# Patient Record
Sex: FEMALE | Race: WHITE | NOT HISPANIC OR LATINO | Employment: FULL TIME
[De-identification: names, ages, dates, MRNs, and addresses within clinical notes are randomized per-mention and may not be internally consistent; named-entity substitution may affect disease eponyms.]

---

## 2018-03-10 NOTE — H&P
"PATIENT:  Rachel Danielle  YOB: 1995  AGE:  22 years  DATE:   2018 10:10 AM   VISIT TYPE: OB Prenatal - Initial  _________________________________________________________________________________________________________________________      This 22 year old female presents for Pregnancy confirmation visit and Viability ultrasound.    History of Present Illness  1.  Pregnancy confirmation visit     presents for pregancy confirmation/initial OB. She reports she was previously seen at a clinic in [place] where she had an 8 week ultrasound with fetal cardiac activity. No lab work was done there.   She presents without complaints. No VB, cramping, abdominal pain, n/v, HA, f/c, abnormal discharge.   She reports h/o MAB 2017. She did not know she was pregnant but had heavy VB. She was given a medication because \"not everything came out\" and reports this gave her terrible cramping and she ultimately had bleeding for nearly 3 months.   She denies medical problems or medications.  She is taking PNV. She does admit marijuana use in early pregnancy before she knew she was pregnant.   The pregnancy confirmation packet was reviewed. Baseline risks of aneuploidy were reviewed including potential screening options including Innatal, FTS, quad screen. Carrier screening was reviewed. She is interested in Innatal and PreParent. Will obtain PNL as well.  2.  Viability ultrasound    MAB    Obstetric History  : 2  Parity:    Term:0  Pre-Term: 0  : 2  Livin    Adopted:  0      Gestational Age  Gestational Age: 11w2d  JOHANA Confirmation  Initial JOHANA: DATE                         EGA                                     JOHANA   LMP 2017 11w2d     2018   FINAL JOHANA 2018  Multiple Gestation:  [place]        MEDICAL HISTORY      Genetic Screening/Teratology Counseling    Includes patient, baby's father or anyone in either family      Yes         No Mother    Father    " Relative   1.  Patient's age 35 years or older as of estimated date          of delivery.  no       2.  Thalassemia (Namibian, Greek, Mediterranean or           background); MCV less than 80         3.  Neural tube defect (meningomyelocele, spina bifida         or        anencephaly)         4.  Congenital heart defect         5.  Down syndrome         6.  Todd-sachs (Ashkenazi Jew, Cajun, French       [place])         7.  Canavan disease (Ashkenazi Jew)         8.  Familial dysautonomia (Ashkenazi Jew)         9.  Sickle cell disease or trait ()        10.  Hemophilia or other blood disorders        11.  Muscular dystrophy        12.  Cystic fibrosis        13.  Claiborne's chorea        14.  Mental retardation/autism        15.  Other inherited genetic or chromosomal disorder        16.  Maternal metabolic disorder (e.g. Type 1 Diabetes,           PKU)        17.  Patient or baby's father had a child with birth defects not listed above.        18.  Recurrent pregnancy loss or a stillbirth        19.  Medications (including supplements, vitamins, herbs or  OTC drugs/illicit/recreational drugs/alcohol since LMP          20.  Other:         Risk factors positive for:    Social Factors   Cats   GENERAL:     Cats in the home.    Obstetric History  Pregnancy # Date GA Labor(hrs) Weight Sex Procedure   1      Spontaneaous    2  8W0D    Missed                              Medication Reconciliation -   Medications reconciled today.    Allergies  Description Reaction   NO KNOWN ALLERGIES    Reviewed, no changes.  No known allergies.    Allergy Comments:  Tree Nuts    Medications (added, continued, or stopped this visit)  Started Medication Directions Instruction Stopped   2018 Prenatal Vitamin tablet take 1 tablet by oral route  every day     2018 Zofran 4 mg tablet take one tab po daily as needed  03/10/2018     Gynecologic History  Patient is premenopausal. Last menses was  2017.     Obstetric History  Not currently pregnant.   2  Parity: Term:0  Pre-Term: 0  : 2  Livin    Spontaneous abortions: 2.    Pregnancy # Date GA Labor(hrs) Weight Sex Procedure   1      Spontaneaous    2  8W0D    Missed      Medical History (Detailed)  Acne   Migraines   Moodiness   Depression       Surgical History/Management (Detailed)    Diagnostics History  ActStatus Study Ordered Completed Interpretation  Result / Report   completed Transvaginal US, OB 2018   MAB     Family History  (Detailed)  Relationship Family Member Name  Age at Death Condition Onset Age Cause of Death   Father  Y 44 Cardiac Arrest  Y   Mother    Mental illness  N     Social History  (Detailed)    General Information  Employment History Status Retired Restrictions            Tobacco  Smoking status: Former smoker.      Smoking Status  Use Status Type Smoking Status Usage Per Day Years Used Total Pack Years   yes  Former smoker              Alcohol  There is no history of alcohol use.      Caffeine  The patient uses caffeine.  Type: soda     Lifestyle  Vigorous activity level.    Never exercises.    Pets include dogs, cats.    Home Environment/Safety  Smoke detector(s) at home.   Additional Social History  The patient does not need an .      Marital Status/Family/Social Support  Currently single.      Prenatal Flowsheet  General  Height(in.):64.5 inches  Pre-pregnancy weight:   150.00.   Last weight: 152.20. Date: 2018.   Total weight gain:  2.20.   Cats in home    Problem Detail  Priority Problem Detail    Primary Dr. Kraus     Initial Physical Exam   Pre-pregnancy weight: 150.00 lbs. Today's weight:    Height: 64.5 inches           Review of Systems  System Neg/Pos Details   Reproductive Positive The patient is pre-menopausal.   Respiratory Negative Cough and dyspnea.   MS Negative Back pain.   Reproductive Negative Vaginal discharge.    Constitutional Negative Change in appetite, chills, chills/rigors, decreased activity, fatigue, fever, fussiness, generalized weakness, increased appetite, irritability, lethargy, malaise, night sweats, pallor, weight gain and weight loss.    Negative Dysuria, hematuria, irregular menses and urinary frequency.   GI Negative Abdominal pain, constipation, decreased appetite, diarrhea, nausea and vomiting.   Psych Negative Anxiety and depression.   Hema/Lymph Negative Easy bleeding and easy bruising.       Vital Signs   Last menses was 2017.   Time BP mm/Hg Pulse/min Resp/min Temp F Ht ft Ht in Wt lb BMI kg/m2 BSA m2   10:01 /70    5.0 4.50 152.20 25.72      MEASURED BY  Time Measured by   10:01 AM Eliza Novoa     Orders/Procedures/Instructions/Education  LABS:  ABO and Rh Type and Antibody Screen       Physical Exam  Exam Findings Details   Constitutional Normal Well developed.   Neck Exam Normal Palpation - Normal. Thyroid gland - Normal.   Respiratory Normal Inspection - Normal. Auscultation - Normal. Effort - Normal.   Cardiovascular Normal Regular rhythm.  No murmurs, gallops, or rubs.   Abdomen Normal Anterior palpation -  No rebound. No abdominal tenderness.   Genitourinary * Cervix - cervix closed, no bleeding. Uterus - 8-10 wk size.   Genitourinary Normal Urethral meatus - Normal. External genitalia - Normal. Glands - Normal. Perineum - Normal. Anus - Normal. Vagina - Normal. Adnexa - Normal. No suprapubic tenderness. No vaginal discharge.   Extremity Normal No edema.   Psychiatric Normal Oriented to time, place, person and situation. Appropriate mood and affect.     Assessment/Plan  # Detail Type Description    1. Assessment Missed  (O02.1).    Provider Plan  with LMP 17 presented as ESTEFANY for pregnancy confirmation and initial OB. On exam, FHT not able to be identified with doptone. She was brought to u/s - fetus measures 8+1 and no FCA is identified. She is sure she was  8 wks along with FCA on previous u/s (we do not have this report). Discussed regardless u/s findings today c/w missed . She has had no pain or bleeding. Given ~3 weeks since this occurred and no VB, pain, cervical dilation, would recommend against expectant management at this point. Discussed medical management with cytotec vs surgical management with D&C. She reports bad experience with cytotec previously which sounds like it was given in setting of retained POC after SAB. She would prefer surgical management. Pt clinically stable at this point and no indication for emergent intervention. The procedure will be scheduled for 11:30 3/12/18 with Dr. Tsai (I will be out of town, he is aware). Blood type obtained, other PNL canceled.    Plan Orders ABO and Rh Type and Antibody Screen to be performed.         2. Assessment Encounter for pregnancy test, result positive (Z32.01).    Plan Orders The patient had the following test(s) completed today Urine pregnancy test.

## 2018-03-12 ENCOUNTER — SURGERY (OUTPATIENT)
Age: 23
End: 2018-03-12

## 2018-03-12 ENCOUNTER — ANESTHESIA (OUTPATIENT)
Dept: SURGERY | Facility: CLINIC | Age: 23
End: 2018-03-12
Payer: COMMERCIAL

## 2018-03-12 ENCOUNTER — ANESTHESIA EVENT (OUTPATIENT)
Dept: SURGERY | Facility: CLINIC | Age: 23
End: 2018-03-12
Payer: COMMERCIAL

## 2018-03-12 ENCOUNTER — HOSPITAL ENCOUNTER (OUTPATIENT)
Facility: CLINIC | Age: 23
Discharge: HOME | End: 2018-03-12
Attending: OBSTETRICS & GYNECOLOGY | Admitting: OBSTETRICS & GYNECOLOGY
Payer: COMMERCIAL

## 2018-03-12 ENCOUNTER — APPOINTMENT (OUTPATIENT)
Dept: ULTRASOUND IMAGING | Facility: CLINIC | Age: 23
End: 2018-03-12
Attending: OBSTETRICS & GYNECOLOGY
Payer: COMMERCIAL

## 2018-03-12 VITALS
BODY MASS INDEX: 25.95 KG/M2 | WEIGHT: 152 LBS | RESPIRATION RATE: 18 BRPM | HEIGHT: 64 IN | DIASTOLIC BLOOD PRESSURE: 74 MMHG | TEMPERATURE: 97.7 F | OXYGEN SATURATION: 97 % | SYSTOLIC BLOOD PRESSURE: 105 MMHG

## 2018-03-12 DIAGNOSIS — O02.1 MISSED ABORTION: Primary | ICD-10-CM

## 2018-03-12 LAB
B-HCG SERPL-ACNC: 5698 IU/L (ref 0–5)
HGB BLD-MCNC: 12.4 G/DL (ref 11.7–15.7)

## 2018-03-12 PROCEDURE — 25000128 H RX IP 250 OP 636: Performed by: NURSE ANESTHETIST, CERTIFIED REGISTERED

## 2018-03-12 PROCEDURE — 25000128 H RX IP 250 OP 636: Performed by: ANESTHESIOLOGY

## 2018-03-12 PROCEDURE — 36000058 ZZH SURGERY LEVEL 3 EA 15 ADDTL MIN: Performed by: OBSTETRICS & GYNECOLOGY

## 2018-03-12 PROCEDURE — 85018 HEMOGLOBIN: CPT | Performed by: OBSTETRICS & GYNECOLOGY

## 2018-03-12 PROCEDURE — 88305 TISSUE EXAM BY PATHOLOGIST: CPT | Mod: 26 | Performed by: OBSTETRICS & GYNECOLOGY

## 2018-03-12 PROCEDURE — 71000027 ZZH RECOVERY PHASE 2 EACH 15 MINS: Performed by: OBSTETRICS & GYNECOLOGY

## 2018-03-12 PROCEDURE — 88305 TISSUE EXAM BY PATHOLOGIST: CPT | Performed by: OBSTETRICS & GYNECOLOGY

## 2018-03-12 PROCEDURE — 37000008 ZZH ANESTHESIA TECHNICAL FEE, 1ST 30 MIN: Performed by: OBSTETRICS & GYNECOLOGY

## 2018-03-12 PROCEDURE — 36415 COLL VENOUS BLD VENIPUNCTURE: CPT | Performed by: OBSTETRICS & GYNECOLOGY

## 2018-03-12 PROCEDURE — 25000566 ZZH SEVOFLURANE, EA 15 MIN: Performed by: OBSTETRICS & GYNECOLOGY

## 2018-03-12 PROCEDURE — 84702 CHORIONIC GONADOTROPIN TEST: CPT | Performed by: OBSTETRICS & GYNECOLOGY

## 2018-03-12 PROCEDURE — 25000125 ZZHC RX 250: Performed by: OBSTETRICS & GYNECOLOGY

## 2018-03-12 PROCEDURE — 37000009 ZZH ANESTHESIA TECHNICAL FEE, EACH ADDTL 15 MIN: Performed by: OBSTETRICS & GYNECOLOGY

## 2018-03-12 PROCEDURE — 40000170 ZZH STATISTIC PRE-PROCEDURE ASSESSMENT II: Performed by: OBSTETRICS & GYNECOLOGY

## 2018-03-12 PROCEDURE — 36000056 ZZH SURGERY LEVEL 3 1ST 30 MIN: Performed by: OBSTETRICS & GYNECOLOGY

## 2018-03-12 PROCEDURE — 25000128 H RX IP 250 OP 636: Performed by: OBSTETRICS & GYNECOLOGY

## 2018-03-12 PROCEDURE — 88233 TISSUE CULTURE SKIN/BIOPSY: CPT | Performed by: OBSTETRICS & GYNECOLOGY

## 2018-03-12 PROCEDURE — 71000012 ZZH RECOVERY PHASE 1 LEVEL 1 FIRST HR: Performed by: OBSTETRICS & GYNECOLOGY

## 2018-03-12 PROCEDURE — 00000159 ZZHCL STATISTIC H-SEND OUTS PREP: Performed by: OBSTETRICS & GYNECOLOGY

## 2018-03-12 PROCEDURE — 40000985 US INTRAOPERATIVE: Mod: TC

## 2018-03-12 PROCEDURE — 25000132 ZZH RX MED GY IP 250 OP 250 PS 637: Performed by: OBSTETRICS & GYNECOLOGY

## 2018-03-12 PROCEDURE — 88262 CHROMOSOME ANALYSIS 15-20: CPT | Performed by: OBSTETRICS & GYNECOLOGY

## 2018-03-12 PROCEDURE — 25000125 ZZHC RX 250: Performed by: NURSE ANESTHETIST, CERTIFIED REGISTERED

## 2018-03-12 PROCEDURE — 71000013 ZZH RECOVERY PHASE 1 LEVEL 1 EA ADDTL HR: Performed by: OBSTETRICS & GYNECOLOGY

## 2018-03-12 RX ORDER — DEXAMETHASONE SODIUM PHOSPHATE 4 MG/ML
INJECTION, SOLUTION INTRA-ARTICULAR; INTRALESIONAL; INTRAMUSCULAR; INTRAVENOUS; SOFT TISSUE PRN
Status: DISCONTINUED | OUTPATIENT
Start: 2018-03-12 | End: 2018-03-12

## 2018-03-12 RX ORDER — OXYCODONE HYDROCHLORIDE 5 MG/1
5 TABLET ORAL
Status: COMPLETED | OUTPATIENT
Start: 2018-03-12 | End: 2018-03-12

## 2018-03-12 RX ORDER — DOXYCYCLINE 100 MG/10ML
100 INJECTION, POWDER, LYOPHILIZED, FOR SOLUTION INTRAVENOUS
Status: COMPLETED | OUTPATIENT
Start: 2018-03-12 | End: 2018-03-12

## 2018-03-12 RX ORDER — DOXYCYCLINE 100 MG/1
200 CAPSULE ORAL ONCE
Status: COMPLETED | OUTPATIENT
Start: 2018-03-12 | End: 2018-03-12

## 2018-03-12 RX ORDER — HYDROMORPHONE HYDROCHLORIDE 1 MG/ML
.3-.5 INJECTION, SOLUTION INTRAMUSCULAR; INTRAVENOUS; SUBCUTANEOUS EVERY 10 MIN PRN
Status: DISCONTINUED | OUTPATIENT
Start: 2018-03-12 | End: 2018-03-12 | Stop reason: HOSPADM

## 2018-03-12 RX ORDER — SODIUM CHLORIDE, SODIUM LACTATE, POTASSIUM CHLORIDE, CALCIUM CHLORIDE 600; 310; 30; 20 MG/100ML; MG/100ML; MG/100ML; MG/100ML
INJECTION, SOLUTION INTRAVENOUS CONTINUOUS PRN
Status: DISCONTINUED | OUTPATIENT
Start: 2018-03-12 | End: 2018-03-12

## 2018-03-12 RX ORDER — OXYCODONE HYDROCHLORIDE 5 MG/1
5-10 TABLET ORAL
Qty: 12 TABLET | Refills: 0 | Status: SHIPPED | OUTPATIENT
Start: 2018-03-12 | End: 2018-06-12

## 2018-03-12 RX ORDER — ONDANSETRON 2 MG/ML
4 INJECTION INTRAMUSCULAR; INTRAVENOUS EVERY 30 MIN PRN
Status: DISCONTINUED | OUTPATIENT
Start: 2018-03-12 | End: 2018-03-12 | Stop reason: HOSPADM

## 2018-03-12 RX ORDER — FENTANYL CITRATE 50 UG/ML
25-50 INJECTION, SOLUTION INTRAMUSCULAR; INTRAVENOUS EVERY 5 MIN PRN
Status: DISCONTINUED | OUTPATIENT
Start: 2018-03-12 | End: 2018-03-12 | Stop reason: HOSPADM

## 2018-03-12 RX ORDER — PROPOFOL 10 MG/ML
INJECTION, EMULSION INTRAVENOUS PRN
Status: DISCONTINUED | OUTPATIENT
Start: 2018-03-12 | End: 2018-03-12

## 2018-03-12 RX ORDER — KETOROLAC TROMETHAMINE 30 MG/ML
30 INJECTION, SOLUTION INTRAMUSCULAR; INTRAVENOUS EVERY 6 HOURS PRN
Status: DISCONTINUED | OUTPATIENT
Start: 2018-03-12 | End: 2018-03-12 | Stop reason: HOSPADM

## 2018-03-12 RX ORDER — SODIUM CHLORIDE, SODIUM LACTATE, POTASSIUM CHLORIDE, CALCIUM CHLORIDE 600; 310; 30; 20 MG/100ML; MG/100ML; MG/100ML; MG/100ML
INJECTION, SOLUTION INTRAVENOUS CONTINUOUS
Status: DISCONTINUED | OUTPATIENT
Start: 2018-03-12 | End: 2018-03-12 | Stop reason: HOSPADM

## 2018-03-12 RX ORDER — FENTANYL CITRATE 50 UG/ML
50-100 INJECTION, SOLUTION INTRAMUSCULAR; INTRAVENOUS
Status: DISCONTINUED | OUTPATIENT
Start: 2018-03-12 | End: 2018-03-12 | Stop reason: HOSPADM

## 2018-03-12 RX ORDER — ONDANSETRON 4 MG/1
4 TABLET, ORALLY DISINTEGRATING ORAL EVERY 30 MIN PRN
Status: DISCONTINUED | OUTPATIENT
Start: 2018-03-12 | End: 2018-03-12 | Stop reason: HOSPADM

## 2018-03-12 RX ORDER — IBUPROFEN 600 MG/1
600 TABLET, FILM COATED ORAL
Status: DISCONTINUED | OUTPATIENT
Start: 2018-03-12 | End: 2018-03-12 | Stop reason: HOSPADM

## 2018-03-12 RX ORDER — ONDANSETRON 2 MG/ML
INJECTION INTRAMUSCULAR; INTRAVENOUS PRN
Status: DISCONTINUED | OUTPATIENT
Start: 2018-03-12 | End: 2018-03-12

## 2018-03-12 RX ORDER — IBUPROFEN 600 MG/1
600 TABLET, FILM COATED ORAL EVERY 6 HOURS PRN
Qty: 30 TABLET | Refills: 0 | Status: SHIPPED | OUTPATIENT
Start: 2018-03-12 | End: 2018-06-12

## 2018-03-12 RX ORDER — NALOXONE HYDROCHLORIDE 0.4 MG/ML
.1-.4 INJECTION, SOLUTION INTRAMUSCULAR; INTRAVENOUS; SUBCUTANEOUS
Status: DISCONTINUED | OUTPATIENT
Start: 2018-03-12 | End: 2018-03-12 | Stop reason: HOSPADM

## 2018-03-12 RX ORDER — LIDOCAINE HYDROCHLORIDE 20 MG/ML
INJECTION, SOLUTION INFILTRATION; PERINEURAL PRN
Status: DISCONTINUED | OUTPATIENT
Start: 2018-03-12 | End: 2018-03-12

## 2018-03-12 RX ORDER — PROPOFOL 10 MG/ML
INJECTION, EMULSION INTRAVENOUS CONTINUOUS PRN
Status: DISCONTINUED | OUTPATIENT
Start: 2018-03-12 | End: 2018-03-12

## 2018-03-12 RX ORDER — DIPHENHYDRAMINE HYDROCHLORIDE 50 MG/ML
INJECTION INTRAMUSCULAR; INTRAVENOUS PRN
Status: DISCONTINUED | OUTPATIENT
Start: 2018-03-12 | End: 2018-03-12

## 2018-03-12 RX ORDER — FENTANYL CITRATE 50 UG/ML
INJECTION, SOLUTION INTRAMUSCULAR; INTRAVENOUS PRN
Status: DISCONTINUED | OUTPATIENT
Start: 2018-03-12 | End: 2018-03-12

## 2018-03-12 RX ORDER — MEPERIDINE HYDROCHLORIDE 25 MG/ML
12.5 INJECTION INTRAMUSCULAR; INTRAVENOUS; SUBCUTANEOUS
Status: DISCONTINUED | OUTPATIENT
Start: 2018-03-12 | End: 2018-03-12 | Stop reason: HOSPADM

## 2018-03-12 RX ORDER — EPHEDRINE SULFATE 50 MG/ML
INJECTION, SOLUTION INTRAMUSCULAR; INTRAVENOUS; SUBCUTANEOUS PRN
Status: DISCONTINUED | OUTPATIENT
Start: 2018-03-12 | End: 2018-03-12

## 2018-03-12 RX ORDER — FENTANYL CITRATE 50 UG/ML
25-50 INJECTION, SOLUTION INTRAMUSCULAR; INTRAVENOUS
Status: DISCONTINUED | OUTPATIENT
Start: 2018-03-12 | End: 2018-03-12 | Stop reason: HOSPADM

## 2018-03-12 RX ADMIN — SUCCINYLCHOLINE CHLORIDE 100 MG: 20 INJECTION, SOLUTION INTRAMUSCULAR; INTRAVENOUS; PARENTERAL at 11:53

## 2018-03-12 RX ADMIN — ONDANSETRON 4 MG: 2 INJECTION INTRAMUSCULAR; INTRAVENOUS at 12:43

## 2018-03-12 RX ADMIN — SODIUM CHLORIDE, POTASSIUM CHLORIDE, SODIUM LACTATE AND CALCIUM CHLORIDE: 600; 310; 30; 20 INJECTION, SOLUTION INTRAVENOUS at 11:49

## 2018-03-12 RX ADMIN — PROPOFOL 150 MCG/KG/MIN: 10 INJECTION, EMULSION INTRAVENOUS at 11:52

## 2018-03-12 RX ADMIN — PHENYLEPHRINE HYDROCHLORIDE 100 MCG: 10 INJECTION INTRAVENOUS at 12:05

## 2018-03-12 RX ADMIN — DEXAMETHASONE SODIUM PHOSPHATE 4 MG: 4 INJECTION, SOLUTION INTRA-ARTICULAR; INTRALESIONAL; INTRAMUSCULAR; INTRAVENOUS; SOFT TISSUE at 12:12

## 2018-03-12 RX ADMIN — SCOPOLAMINE HYDROBROMIDE 0.4 MG: 0.4 INJECTION, SOLUTION INTRAMUSCULAR; INTRAVENOUS; SUBCUTANEOUS at 12:00

## 2018-03-12 RX ADMIN — OXYCODONE HYDROCHLORIDE 5 MG: 5 TABLET ORAL at 13:49

## 2018-03-12 RX ADMIN — ONDANSETRON 4 MG: 2 INJECTION INTRAMUSCULAR; INTRAVENOUS at 12:12

## 2018-03-12 RX ADMIN — DOXYCYCLINE 100 MG: 100 INJECTION, POWDER, LYOPHILIZED, FOR SOLUTION INTRAVENOUS at 12:00

## 2018-03-12 RX ADMIN — FENTANYL CITRATE 50 MCG: 50 INJECTION, SOLUTION INTRAMUSCULAR; INTRAVENOUS at 13:36

## 2018-03-12 RX ADMIN — PHENYLEPHRINE HYDROCHLORIDE 100 MCG: 10 INJECTION INTRAVENOUS at 12:34

## 2018-03-12 RX ADMIN — KETOROLAC TROMETHAMINE 30 MG: 30 INJECTION, SOLUTION INTRAMUSCULAR at 13:40

## 2018-03-12 RX ADMIN — MIDAZOLAM 2 MG: 1 INJECTION INTRAMUSCULAR; INTRAVENOUS at 11:53

## 2018-03-12 RX ADMIN — PROPOFOL 200 MG: 10 INJECTION, EMULSION INTRAVENOUS at 11:53

## 2018-03-12 RX ADMIN — PHENYLEPHRINE HYDROCHLORIDE 100 MCG: 10 INJECTION INTRAVENOUS at 12:07

## 2018-03-12 RX ADMIN — FENTANYL CITRATE 100 MCG: 50 INJECTION, SOLUTION INTRAMUSCULAR; INTRAVENOUS at 11:53

## 2018-03-12 RX ADMIN — DIPHENHYDRAMINE HYDROCHLORIDE 12.5 MG: 50 INJECTION, SOLUTION INTRAMUSCULAR; INTRAVENOUS at 12:09

## 2018-03-12 RX ADMIN — PHENYLEPHRINE HYDROCHLORIDE 100 MCG: 10 INJECTION INTRAVENOUS at 11:58

## 2018-03-12 RX ADMIN — LIDOCAINE HYDROCHLORIDE 100 MG: 20 INJECTION, SOLUTION INFILTRATION; PERINEURAL at 11:53

## 2018-03-12 RX ADMIN — DOXYCYCLINE HYCLATE 200 MG: 100 CAPSULE, GELATIN COATED ORAL at 13:59

## 2018-03-12 RX ADMIN — FENTANYL CITRATE 50 MCG: 50 INJECTION, SOLUTION INTRAMUSCULAR; INTRAVENOUS at 13:28

## 2018-03-12 RX ADMIN — Medication 5 MG: at 12:10

## 2018-03-12 ASSESSMENT — ENCOUNTER SYMPTOMS
ORTHOPNEA: 0
SEIZURES: 0
DYSRHYTHMIAS: 0

## 2018-03-12 ASSESSMENT — LIFESTYLE VARIABLES: TOBACCO_USE: 0

## 2018-03-12 NOTE — ANESTHESIA CARE TRANSFER NOTE
Patient: Rachel Danielle    Procedure(s):   - Wound Class: II-Clean Contaminated    Diagnosis: MISSED AB  Diagnosis Additional Information: No value filed.    Anesthesia Type:   General, RSI, ETT     Note:  Airway :Face Mask  Patient transferred to:PACU  Comments: Pt to PACU. VSS. Report complete to RNHandoff Report: Identifed the Patient, Identified the Reponsible Provider, Reviewed the pertinent medical history, Discussed the surgical course, Reviewed Intra-OP anesthesia mangement and issues during anesthesia, Set expectations for post-procedure period and Allowed opportunity for questions and acknowledgement of understanding      Vitals: (Last set prior to Anesthesia Care Transfer)    CRNA VITALS  3/12/2018 1219 - 3/12/2018 1255      3/12/2018             Pulse: 129    SpO2: 99 %    Resp Rate (observed): 12    Resp Rate (set): 10                Electronically Signed By: Monica Che CRNA, ERIKA DUMONT  March 12, 2018  12:55 PM

## 2018-03-12 NOTE — IP AVS SNAPSHOT
[facility]    [address]    [place] [address]    Phone:  [phone]    Fax:  [phone]                                       After Visit Summary   3/12/2018    Rachel Danielle    MRN: [id number]           After Visit Summary Signature Page     I have received my discharge instructions, and my questions have been answered. I have discussed any challenges I see with this plan with the nurse or doctor.    ..........................................................................................................................................  Patient/Patient Representative Signature      ..........................................................................................................................................  Patient Representative Print Name and Relationship to Patient    ..................................................               ................................................  Date                                            Time    ..........................................................................................................................................  Reviewed by Signature/Title    ...................................................              ..............................................  Date                                                            Time

## 2018-03-12 NOTE — IP AVS SNAPSHOT
MRN:[id number]                      After Visit Summary   3/12/2018    Rachel Danielle    MRN: [id number]           Thank you!     Thank you for choosing [facility] for your care. Our goal is always to provide you with excellent care. Hearing back from our patients is one way we can continue to improve our services. Please take a few minutes to complete the written survey that you may receive in the mail after you visit with us. Thank you!        Patient Information     Date Of Birth          1995        About your hospital stay     You were admitted on:  March 12, 2018 You last received care in the:  [facility] Same Day [facility]    You were discharged on:  March 12, 2018       Who to Call     For medical emergencies, please call 911.  For non-urgent questions about your medical care, please call your primary care provider or clinic, None  For questions related to your surgery, please call your surgery clinic        Attending Provider     Provider Lukas Callahan MD OB/Gyn       Primary Care Provider Fax #    Physician No Ref-Primary [phone]      After Care Instructions     Discharge Instructions       Resume pre procedure diet            Discharge Instructions       Pelvic Rest. No tampons, douching or intercourse for at least 2 weeks.            Discharge Instructions       Patient to arrange follow up appointment in 2-4 weeks.            No driving or operating machinery       No driving or operating machinery until day after procedure                  Further instructions from your care team       Same Day Surgery Discharge Instructions for  Sedation and General Anesthesia       It's not unusual to feel dizzy, light-headed or faint for up to 24 hours after surgery or while taking pain medication.  If you have these symptoms: sit for a few minutes before standing and have someone assist you when you get up to walk or use the bathroom.      You should rest and  relax for the next 24 hours. We recommend you make arrangements to have an adult stay with you for at least 24 hours after your discharge.  Avoid hazardous and strenuous activity.      DO NOT DRIVE any vehicle or operate mechanical equipment for 24 hours following the end of your surgery.  Even though you may feel normal, your reactions may be affected by the medication you have received.      Do not drink alcoholic beverages for 24 hours following surgery.       Slowly progress to your regular diet as you feel able. It's not unusual to feel nauseated and/or vomit after receiving anesthesia.  If you develop these symptoms, drink clear liquids (apple juice, ginger ale, broth, 7-up, etc. ) until you feel better.  If your nausea and vomiting persists for 24 hours, please notify your surgeon.        All narcotic pain medications, along with inactivity and anesthesia, can cause constipation. Drinking plenty of liquids and increasing fiber intake will help.      For any questions of a medical nature, call your surgeon.      Do not make important decisions for 24 hours.      If you had general anesthesia, you may have a sore throat for a couple of days related to the breathing tube used during surgery.  You may use Cepacol lozenges to help with this discomfort.  If it worsens or if you develop a fever, contact your surgeon.       If you feel your pain is not well managed with the pain medications prescribed by your surgeon, please contact your surgeon's office to let them know so they can address your concerns.       Today you received Toradol, an antiinflammatory medication similar to Ibuprofen.  You should not take other antiinflammatory medication, such as Ibuprofen, Motrin, Advil, Aleve, Naprosyn, etc, until 7:45 P.M.      [facility]  D & C Surgery  Discharge Instructions  ACTIVITY:   You may resume normal activities including lifting as needed. It is permissible to drive a car and to climb stairs.  Baths or showers are perfectly acceptable.  CHECK-UP:   You should be seen 1 month after discharge unless home instruction sheet states otherwise. Please phone the office the day after procedure and schedule an appointment with your physician.  VAGINAL DISCHARGE:   You may have some vaginal bleeding or discharge for about a week after discharge. You should avoid douches, tampons, and intercourse for the first week.  TEMPERATURE:   If you develop temperature levels to over 100.4 , your physician should be called immediately.  STITCHES:   There is usually a stitch under the skin incisions which will dissolve and does not need to be removed. The bandaids may be removed at any time.  DIET:  Harlan or light diet is advisable the day of surgery. If nausea persists, continue this diet. If the nausea is severe, call the physician.  [facility]  [address], [address]  [place], [place] [address]  [phone]    ____________________________________________    Our hearts go out to you at this difficult time. Be assured that there is no right way to find comfort and support. It may take time to find out what works for you.  Please do not hesitate to ask for support from our nurses, social workers, or chaplains.  After you leave the hospital, if you need help finding support resources, please call [phone].  [facility] hosts a support group for anyone who has had a pregnancy loss providing a supportive place to learn and share. Couples are encouraged to attend together.     Where: [facility], [facility], [facility]  When: 1st and 3rd Thursday of each month, 5:00pm - 6:30pm  To register, contact:    Ángel *[phone] *[email or web address]   Katelyn *[phone] *[email or web address]    ______________________________________________      Information for Patients Discharging with a Transderm Scopolamine Patch       Dry mouth is a common side  "effect.    Drowsiness is another common side effect especially when combined with pain medication.  Please avoid activities that require mental alertness such as driving a car or making important legal decisions.    Since Scopolamine can cause temporary dilation of the pupils and blurred vision if it comes in contact with the eyes; be sure to wash your hands thoroughly with soap and water immediately after handling the patch.   When you remove your patch, please stick it to a tissue or paper towel for disposal.      Remove the patch immediately and contact a physician in the unlikely event that you experience symptoms of acute glaucoma (pain and reddening of the eyes, accompanied by dilated pupils).    Remove the patch if you develop any difficulties urinating.  If you cannot urinate after removing your patch, please notify your surgeon.    Remove the patch 24 hours after surgery.        **If you have questions or concerns about your procedure,   call Dr Tsai at  [phone]**        Pending Results     Date and Time Order Name Status Description    3/12/2018 1240 CHROMOSOME SKIN PRODUCTS OF CONCEPTION With Professional Interpretation In process     3/12/2018 1240 Surgical pathology exam In process             Statement of Approval     Ordered          03/12/18 3816  I have reviewed and agree with all the recommendations and orders detailed in this document.  EFFECTIVE NOW     Approved and electronically signed by:  Lukas Tsai MD             Admission Information     Date & Time Provider Department Dept. Phone    3/12/2018 Lukas Tsai MD [facility] Same Day Surgery [phone]      Your Vitals Were     Blood Pressure Temperature Respirations Height Weight Last Period    126/81 97.7  F (36.5  C) (Temporal) 19 1.626 m (5' 4\") 68.9 kg (152 lb) 12/20/2017    Pulse Oximetry BMI (Body Mass Index)                100% 26.09 kg/m2          MyChart Information     [employer] lets you send messages to your " "doctor, view your test results, renew your prescriptions, schedule appointments and more. To sign up, go to [email or web address] . Click on \"Log in\" on the left side of the screen, which will take you to the Welcome page. Then click on \"Sign up Now\" on the right side of the page.     You will be asked to enter the access code listed below, as well as some personal information. Please follow the directions to create your username and password.     Your access code is: [email or web address]  Expires: 6/10/2018  1:18 PM     Your access code will  in 90 days. If you need help or a new code, please call your [facility] clinic or [phone].        Care EveryWhere ID     This is your Care EveryWhere ID. This could be used by other organizations to access your [facility] medical records  [id number]        Equal Access to Services     [facility] : Jake Dorsey, digna manuel, rabia messer, rand henriquez . [facility] [phone].    ATENCIÓN: Si habla español, tiene a liu disposición servicios gratuitos de asistencia lingüística. Chase al [phone].    We comply with applicable federal civil rights laws and [place] laws. We do not discriminate on the basis of race, color, national origin, age, disability, sex, sexual orientation, or gender identity.               Review of your medicines      START taking        Dose / Directions    ibuprofen 600 MG tablet   Commonly known as:  ADVIL/MOTRIN   Notes to Patient:  May take next dose at 7:45 p.m.        Dose:  600 mg   Take 1 tablet (600 mg) by mouth every 6 hours as needed for pain (mild)   Quantity:  30 tablet   Refills:  0       oxyCODONE IR 5 MG tablet   Commonly known as:  ROXICODONE   Notes to Patient:  1 tablet taken at 1:50 p.m.        Dose:  5-10 mg   Take 1-2 tablets (5-10 mg) by mouth every 3 hours as needed for pain or other (Moderate to Severe)   Quantity:  12 tablet   Refills:  0         CONTINUE these " medicines which have NOT CHANGED        Dose / Directions    ZOFRAN PO        Refills:  0            Where to get your medicines      These medications were sent to [facility] [place], [place] - [address]  [address] [place] [address]     Phone:  [phone]     ibuprofen 600 MG tablet         Some of these will need a paper prescription and others can be bought over the counter. Ask your nurse if you have questions.     Bring a paper prescription for each of these medications     oxyCODONE IR 5 MG tablet                Protect others around you: Learn how to safely use, store and throw away your medicines at www.disposemymeds.org.        Information about OPIOIDS     PRESCRIPTION OPIOIDS: WHAT YOU NEED TO KNOW    Prescription opioids can be used to help relieve moderate to severe pain and are often prescribed following a surgery or injury, or for certain health conditions. These medications can be an important part of treatment but also come with serious risks. It is important to work with your health care provider to make sure you are getting the safest, most effective care.    WHAT ARE THE RISKS AND SIDE EFFECTS OF OPIOID USE?  Prescription opioids carry serious risks of addiction and overdose, especially with prolonged use. An opioid overdose, often marked by slowed breathing can cause sudden death. The use of prescription opioids can have a number of side effects as well, even when taken as directed:      Tolerance - meaning you might need to take more of a medication for the same pain relief    Physical dependence - meaning you have symptoms of withdrawal when a medication is stopped    Increased sensitivity to pain    Constipation    Nausea, vomiting, and dry mouth    Sleepiness and dizziness    Confusion    Depression    Low levels of testosterone that can result in lower sex drive, energy, and strength    Itching and sweating    RISKS ARE GREATER WITH:    History of drug  misuse, substance use disorder, or overdose    Mental health conditions (such as depression or anxiety)    Sleep apnea    Older age (65 years or older)    Pregnancy    Avoid alcohol while taking prescription opioids.   Also, unless specifically advised by your health care provider, medications to avoid include:    Benzodiazepines (such as Xanax or Valium)    Muscle relaxants (such as Soma or Flexeril)    Hypnotics (such as Ambien or Lunesta)    Other prescription opioids    KNOW YOUR OPTIONS:  Talk to your health care provider about ways to manage your pain that do not involve prescription opioids. Some of these options may actually work better and have fewer risks and side effects:    Pain relievers such as acetaminophen, ibuprofen, and naproxen    Some medications that are also used for depression or seizures    Physical therapy and exercise    Cognitive behavioral therapy, a psychological, goal-directed approach, in which patients learn how to modify physical, behavioral, and emotional triggers of pain and stress    IF YOU ARE PRESCRIBED OPIOIDS FOR PAIN:    Never take opioids in greater amounts or more often than prescribed    Follow up with your primary health care provider and work together to create a plan on how to manage your pain.    Talk about ways to help manage your pain that do not involve prescription opioids    Talk about all concerns and side effects    Help prevent misuse and abuse    Never sell or share prescription opioids    Never use another person's prescription opioids    Store prescription opioids in a secure place and out of reach of others (this may include visitors, children, friends, and family)    Visit www.cdc.gov/drugoverdose to learn about risks of opioid abuse and overdose    If you believe you may be struggling with addiction, tell your health care provider and ask for guidance or call [facility]'s National Helpline at 0-092-372-HELP    LEARN MORE /  www.cdc.gov/drugoverdose/prescribing/guideline.html    Safely dispose of unused prescription opioids: Find your local drug take-back programs and more information about the importance of safe disposal at www.doseofreality.[place].gov             Medication List: This is a list of all your medications and when to take them. Check marks below indicate your daily home schedule. Keep this list as a reference.      Medications           Morning Afternoon Evening Bedtime As Needed    ibuprofen 600 MG tablet   Commonly known as:  ADVIL/MOTRIN   Take 1 tablet (600 mg) by mouth every 6 hours as needed for pain (mild)   Notes to Patient:  May take next dose at 7:45 p.m.                                oxyCODONE IR 5 MG tablet   Commonly known as:  ROXICODONE   Take 1-2 tablets (5-10 mg) by mouth every 3 hours as needed for pain or other (Moderate to Severe)   Last time this was given:  5 mg on 3/12/2018  1:49 PM   Notes to Patient:  1 tablet taken at 1:50 p.m.                                IGNACIO ROGERS                                          More Information        Discharge Instructions for Miscarriage  You have had a miscarriage. This is the unplanned end of a pregnancy before the baby is able to live outside the womb. You may have experienced a shock to your system, both physically and emotionally. Because of this, you may not feel well for a few days. Your body is going through changes, and you can expect mood swings. When you are ready, start back to your normal routine.  Home care  Suggestions for care at home include:    Return to work or your daily routines when you feel ready. This might be right away, or you may want to wait a few days.    Take showers instead of tub baths. This helps prevent infection. Ask your healthcare provider when you can take baths again.    Avoid strenuous exercise, such as aerobics or running, until the bleeding slows to the rate of a normal period.    Don t have sexual intercourse or use tampons  or douches until your healthcare provider says it s OK.    Get emotional support. Ask your healthcare provider about support groups in your area. Many women find it helpful to talk to other women who have had a miscarriage.  Follow-up  Make a follow-up appointment with your healthcare provider.  When to call your healthcare provider  Call your healthcare provider right away if you have any of the following:    Fever above 100.4 F (38 C) or chills    Bright red vaginal bleeding or a smelly discharge    Vaginal bleeding that soaks more than one menstrual pad per hour    Belly pain that is severe or getting worse   Date Last Reviewed: 6/1/2016 2000-2017 [employer]. [address] [place] [address], [place] [address]. All rights reserved. This information is not intended as a substitute for professional medical care. Always follow your healthcare professional's instructions.

## 2018-03-12 NOTE — ANESTHESIA POSTPROCEDURE EVALUATION
Patient: Rachel Danielle    Procedure(s):   - Wound Class: II-Clean Contaminated    Diagnosis:MISSED AB  Diagnosis Additional Information: No value filed.    Anesthesia Type:  General, RSI, ETT    Note:  Anesthesia Post Evaluation    Patient location during evaluation: PACU  Patient participation: Able to fully participate in evaluation  Level of consciousness: awake  Pain management: adequate  Airway patency: patent  Cardiovascular status: acceptable  Respiratory status: acceptable  Hydration status: acceptable  PONV: none     Anesthetic complications: None          Last vitals:  Vitals:    03/12/18 1300 03/12/18 1400 03/12/18 1520   BP: 126/81 109/74 105/74   Resp: 19 18 18   Temp:      SpO2: 100% 100% 97%         Electronically Signed By: Celestine Nash MD  March 12, 2018  5:19 PM

## 2018-03-12 NOTE — DISCHARGE INSTRUCTIONS
Same Day Surgery Discharge Instructions for  Sedation and General Anesthesia       It's not unusual to feel dizzy, light-headed or faint for up to 24 hours after surgery or while taking pain medication.  If you have these symptoms: sit for a few minutes before standing and have someone assist you when you get up to walk or use the bathroom.      You should rest and relax for the next 24 hours. We recommend you make arrangements to have an adult stay with you for at least 24 hours after your discharge.  Avoid hazardous and strenuous activity.      DO NOT DRIVE any vehicle or operate mechanical equipment for 24 hours following the end of your surgery.  Even though you may feel normal, your reactions may be affected by the medication you have received.      Do not drink alcoholic beverages for 24 hours following surgery.       Slowly progress to your regular diet as you feel able. It's not unusual to feel nauseated and/or vomit after receiving anesthesia.  If you develop these symptoms, drink clear liquids (apple juice, ginger ale, broth, 7-up, etc. ) until you feel better.  If your nausea and vomiting persists for 24 hours, please notify your surgeon.        All narcotic pain medications, along with inactivity and anesthesia, can cause constipation. Drinking plenty of liquids and increasing fiber intake will help.      For any questions of a medical nature, call your surgeon.      Do not make important decisions for 24 hours.      If you had general anesthesia, you may have a sore throat for a couple of days related to the breathing tube used during surgery.  You may use Cepacol lozenges to help with this discomfort.  If it worsens or if you develop a fever, contact your surgeon.       If you feel your pain is not well managed with the pain medications prescribed by your surgeon, please contact your surgeon's office to let them know so they can address your concerns.       Today you received Toradol, an  antiinflammatory medication similar to Ibuprofen.  You should not take other antiinflammatory medication, such as Ibuprofen, Motrin, Advil, Aleve, Naprosyn, etc, until 7:45 P.M.      [facility]  D & C Surgery  Discharge Instructions  ACTIVITY:   You may resume normal activities including lifting as needed. It is permissible to drive a car and to climb stairs. Baths or showers are perfectly acceptable.  CHECK-UP:   You should be seen 1 month after discharge unless home instruction sheet states otherwise. Please phone the office the day after procedure and schedule an appointment with your physician.  VAGINAL DISCHARGE:   You may have some vaginal bleeding or discharge for about a week after discharge. You should avoid douches, tampons, and intercourse for the first week.  TEMPERATURE:   If you develop temperature levels to over 100.4 , your physician should be called immediately.  STITCHES:   There is usually a stitch under the skin incisions which will dissolve and does not need to be removed. The bandaids may be removed at any time.  DIET:  Garza or light diet is advisable the day of surgery. If nausea persists, continue this diet. If the nausea is severe, call the physician.  [facility]  [address] [facility], [address]  [place] [address]  [phone]    ____________________________________________    Our hearts go out to you at this difficult time. Be assured that there is no right way to find comfort and support. It may take time to find out what works for you.  Please do not hesitate to ask for support from our nurses, social workers, or chaplains.  After you leave the hospital, if you need help finding support resources, please call [phone].  [facility] hosts a support group for anyone who has had a pregnancy loss providing a supportive place to learn and share. Couples are encouraged to attend together.     Where: [facility]  [facility], [facility], [facility]  When: 1st and 3rd Thursday of each month, 5:00pm - 6:30pm  To register, contact:    Ángel *[phone] *[email or web address]   Katelyn *[phone] *[email or web address]    ______________________________________________      Information for Patients Discharging with a Transderm Scopolamine Patch       Dry mouth is a common side effect.    Drowsiness is another common side effect especially when combined with pain medication.  Please avoid activities that require mental alertness such as driving a car or making important legal decisions.    Since Scopolamine can cause temporary dilation of the pupils and blurred vision if it comes in contact with the eyes; be sure to wash your hands thoroughly with soap and water immediately after handling the patch.   When you remove your patch, please stick it to a tissue or paper towel for disposal.      Remove the patch immediately and contact a physician in the unlikely event that you experience symptoms of acute glaucoma (pain and reddening of the eyes, accompanied by dilated pupils).    Remove the patch if you develop any difficulties urinating.  If you cannot urinate after removing your patch, please notify your surgeon.    Remove the patch 24 hours after surgery.        **If you have questions or concerns about your procedure,   call Dr Tsai at  [phone]**

## 2018-03-12 NOTE — OP NOTE
Suction Dilation and Curettage Procedure Note    Date of Service: 3/12/2018  Surgeon(s):Surgeon(s) and Role:     * Lukas Tsai MD - Primary  Anesthesia Staff: Anesthesiologist: Celestine Nash MD  CRNA: Monica Che APRN CRNA  OR Staff: Circulator: Jennie Rosas RN  Scrub Person: Kalpana Steiner                                                                   Pre-Operative Diagnoses: 1) Missed    Post-Operative Diagnoses: same  Procedure(s) performed: Suction dilation and curettage with ultrasound guidance and tissue for chromosomal analysis    Type of Anesthesia used: General    Complications: None; patient tolerated the procedure well.    Estimated Blood Loss: 500mL    Findings: approximately 10w sized uterus with large amount of POC       Specimens: products of conception for pathology and chromosomes    Indications: This is a 22 year old  who presented to the office at 11w2d gestation but was found to have had a missed , only measuring 8w1d gestation on ultrasound. Her blood type was checked and is Rh positive. Management options for miscarriage of expectant, medical, or surgical had previously been explained to the patient in detail, and she elected for surgical management with suction dilation and curettage, requesting chromosomal analysis of tissue in addition to pathology given this was her second miscarriage. Risks and benefits of the procedure were explained in detail and the patient consented to the procedure, which was undertaken as discussed with her.    Post-op: Patient transferred to PACU in stable condition.      Procedure Details:   The patient was taken to the operating room where general anesthesia was smoothly induced. The patient was prepped and draped in the normal sterile fashion, in dorsal lithotomy position in yellow-fin stirrups. SCDs were on and functioning and the patient's bladder was drained with a straight-catheter during the prep. After an  appropriate time out, a weighted speculum was placed in the vagina, and the anterior lip of the cervix was grasped with a single-tooth tenaculum. The cervix was serially dilated with Leni dilators to a size 8. The uterus sounded to 10cm. The suction device was tested and passed and with the suction disengaged, a size 8 suction curette was gently advanced to the fundus. Once the suction curette was at the fundus, the suction was engaged and return of an unexpectedly large amount of tissue was noted. Several passes with the suction curette were made and during the procedure, the tissue collection suction canister completely filled and had to be replaced. Once no further return of tissue with the suction was noted, a gentle sharp curettage was performed, noting brisk constant bleeding from the cervical os continuing. With the curette, some retained tissue was palpable but difficult to separate. At this time, ultrasound was requested for guidance in locating the remaining tissue given her bleeding and the unexpectedly large volume of tissue. Ultrasound confirmed no uterine perforations and some residual tissue attached in the right anterior cornua. Using ultrasound guidance, the remaining tissue was curetted and then removed with one final pass with the suction. Ultrasound confirmed no tissue remained and it was noted with this remaining tissue removed, the bleeding completely stopped. The single-tooth tenaculum was removed from the anterior lip of the cervix, noting hemostasis of the tenaculum sites, and no oozing was noted from the cervical os. The weighted speculum was the removed from the vagina. Sponge, lap, and instrument counts were correct x2 at at the completion of the case. The patient tolerated the procedure well and was taken to the recovery room in stable condition.

## 2018-03-12 NOTE — ANESTHESIA PREPROCEDURE EVALUATION
Procedure: Procedure(s):  DILATION AND CURETTAGE SUCTION  Preop diagnosis: MISSED AB    No Known Allergies  No past medical history on file.  History reviewed. No pertinent surgical history.  Social History   Substance Use Topics     Smoking status: Not on file     Smokeless tobacco: Not on file     Alcohol use Not on file     Prior to Admission medications    Medication Sig Start Date End Date Taking? Authorizing Provider   Ondansetron HCl (ZOFRAN PO)    Yes Reported, Patient     Current Facility-Administered Medications Ordered in Epic   Medication Dose Route Frequency Last Rate Last Dose     doxycycline (VIBRAMYCIN) 100 mg vial to attach to  mL bag  100 mg Intravenous Pre-Op/Pre-procedure x 1 dose         Provider ordered ALTERNATE pre op antibiotic.  1 each As instructed Continuous         No current Epic-ordered outpatient prescriptions on file.       Another Antibiotic has been ordered.       Wt Readings from Last 1 Encounters:   No data found for Wt     Temp Readings from Last 1 Encounters:   No data found for Temp     BP Readings from Last 6 Encounters:   No data found for BP     Pulse Readings from Last 4 Encounters:   No data found for Pulse     Resp Readings from Last 1 Encounters:   No data found for Resp     SpO2 Readings from Last 1 Encounters:   No data found for SpO2     RECENT LABS:   A+, AB-      Anesthesia Evaluation     . Pt has not had prior anesthetic     History of anesthetic complications   - motion sickness        ROS/MED HX    ENT/Pulmonary:      (-) tobacco use, asthma, sleep apnea and recent URI   Neurologic:      (-) seizures and CVA   Cardiovascular:        (-) hypertension, orthopnea/PND, syncope, arrhythmias, irregular heartbeat/palpitations and valvular problems/murmurs   METS/Exercise Tolerance:  >4 METS   Hematologic:         Musculoskeletal:         GI/Hepatic:        (-) GERD and liver disease   Renal/Genitourinary:      (-) renal disease   Endo:      (-) Type II DM and  thyroid disease   Psychiatric:         Infectious Disease:         Malignancy:         Other: Comment: Missed AB                    Physical Exam  Normal systems: dental    Airway   Mallampati: II  TM distance: >3 FB  Neck ROM: full    Dental     Cardiovascular   Rhythm and rate: regular and normal  (-) no murmur    Pulmonary    breath sounds clear to auscultation(-) no wheezes                    Anesthesia Plan      History & Physical Review  History and physical reviewed and following examination; no interval change.    ASA Status:  1 .    NPO Status:  > 8 hours    Plan for General, RSI and ETT with Propofol and Intravenous induction. Maintenance will be TIVA.    PONV prophylaxis:  Other (See comment), Dexamethasone or Solumedrol, Ondansetron (or other 5HT-3) and Scopolamine patch (TIVA)  RSI secondary to current nausea   Propofol infusion/TIVA  Scopolamine patch  Decadron prior to incision  Zofran 8 mg (divided over last 30 minutes of procedure)  Benadryl 12.5 mg       Postoperative Care  Postoperative pain management:  Multi-modal analgesia.      Consents  Anesthetic plan, risks, benefits and alternatives discussed with:  Patient..

## 2018-03-13 LAB — COPATH REPORT: NORMAL

## 2018-04-23 LAB — COPATH REPORT: NORMAL

## 2018-06-12 ENCOUNTER — TELEPHONE (OUTPATIENT)
Dept: BEHAVIORAL HEALTH | Facility: CLINIC | Age: 23
End: 2018-06-12

## 2018-06-12 ENCOUNTER — HOSPITAL ENCOUNTER (INPATIENT)
Facility: CLINIC | Age: 23
LOS: 3 days | Discharge: HOME | DRG: 885 | End: 2018-06-15
Attending: PSYCHIATRY & NEUROLOGY | Admitting: PSYCHIATRY & NEUROLOGY
Payer: COMMERCIAL

## 2018-06-12 DIAGNOSIS — F12.90 MARIJUANA USE: ICD-10-CM

## 2018-06-12 DIAGNOSIS — R45.851 SUICIDAL IDEATION: ICD-10-CM

## 2018-06-12 DIAGNOSIS — F33.1 MAJOR DEPRESSIVE DISORDER, RECURRENT EPISODE, MODERATE (H): ICD-10-CM

## 2018-06-12 LAB
AMPHETAMINES UR QL SCN: NEGATIVE
BARBITURATES UR QL: NEGATIVE
BENZODIAZ UR QL: NEGATIVE
CANNABINOIDS UR QL SCN: POSITIVE
COCAINE UR QL: NEGATIVE
ETHANOL UR QL SCN: NEGATIVE
HCG UR QL: NEGATIVE
OPIATES UR QL SCN: NEGATIVE

## 2018-06-12 PROCEDURE — 99285 EMERGENCY DEPT VISIT HI MDM: CPT | Mod: Z6 | Performed by: PSYCHIATRY & NEUROLOGY

## 2018-06-12 PROCEDURE — 80307 DRUG TEST PRSMV CHEM ANLYZR: CPT | Performed by: FAMILY MEDICINE

## 2018-06-12 PROCEDURE — 12400007 ZZH R&B MH INTERMEDIATE UMMC

## 2018-06-12 PROCEDURE — 81025 URINE PREGNANCY TEST: CPT | Performed by: FAMILY MEDICINE

## 2018-06-12 PROCEDURE — 99285 EMERGENCY DEPT VISIT HI MDM: CPT | Mod: 25

## 2018-06-12 PROCEDURE — 90791 PSYCH DIAGNOSTIC EVALUATION: CPT

## 2018-06-12 PROCEDURE — 80320 DRUG SCREEN QUANTALCOHOLS: CPT | Performed by: FAMILY MEDICINE

## 2018-06-12 PROCEDURE — 25000132 ZZH RX MED GY IP 250 OP 250 PS 637: Performed by: NURSE PRACTITIONER

## 2018-06-12 RX ORDER — ACETAMINOPHEN 325 MG/1
650 TABLET ORAL EVERY 4 HOURS PRN
Status: DISCONTINUED | OUTPATIENT
Start: 2018-06-12 | End: 2018-06-15 | Stop reason: HOSPADM

## 2018-06-12 RX ORDER — ALUMINA, MAGNESIA, AND SIMETHICONE 2400; 2400; 240 MG/30ML; MG/30ML; MG/30ML
30 SUSPENSION ORAL EVERY 4 HOURS PRN
Status: DISCONTINUED | OUTPATIENT
Start: 2018-06-12 | End: 2018-06-15 | Stop reason: HOSPADM

## 2018-06-12 RX ORDER — TRAZODONE HYDROCHLORIDE 50 MG/1
50 TABLET, FILM COATED ORAL
Status: DISCONTINUED | OUTPATIENT
Start: 2018-06-12 | End: 2018-06-15 | Stop reason: HOSPADM

## 2018-06-12 RX ORDER — IBUPROFEN 600 MG/1
600 TABLET, FILM COATED ORAL EVERY 6 HOURS PRN
Status: DISCONTINUED | OUTPATIENT
Start: 2018-06-12 | End: 2018-06-15 | Stop reason: HOSPADM

## 2018-06-12 RX ORDER — OLANZAPINE 5 MG/1
5 TABLET ORAL
Status: DISCONTINUED | OUTPATIENT
Start: 2018-06-12 | End: 2018-06-15 | Stop reason: HOSPADM

## 2018-06-12 RX ORDER — HYDROXYZINE HYDROCHLORIDE 25 MG/1
25 TABLET, FILM COATED ORAL EVERY 4 HOURS PRN
Status: DISCONTINUED | OUTPATIENT
Start: 2018-06-12 | End: 2018-06-15 | Stop reason: HOSPADM

## 2018-06-12 RX ORDER — OLANZAPINE 10 MG/2ML
5 INJECTION, POWDER, FOR SOLUTION INTRAMUSCULAR
Status: DISCONTINUED | OUTPATIENT
Start: 2018-06-12 | End: 2018-06-15 | Stop reason: HOSPADM

## 2018-06-12 RX ADMIN — IBUPROFEN 600 MG: 600 TABLET ORAL at 19:06

## 2018-06-12 ASSESSMENT — ACTIVITIES OF DAILY LIVING (ADL)
AMBULATION: 0-->INDEPENDENT
BATHING: 0 - INDEPENDENT
AMBULATION: 0 - INDEPENDENT
TRANSFERRING: 0-->INDEPENDENT
RETIRED_COMMUNICATION: 0-->UNDERSTANDS/COMMUNICATES WITHOUT DIFFICULTY
COMMUNICATION: 0 - UNDERSTANDS/COMMUNICATES WITHOUT DIFFICULTY
TOILETING: 0-->INDEPENDENT
RETIRED_EATING: 0-->INDEPENDENT
DRESS: INDEPENDENT
COGNITION: 0 - NO COGNITION ISSUES REPORTED
CHANGE_IN_FUNCTIONAL_STATUS_SINCE_ONSET_OF_CURRENT_ILLNESS/INJURY: NO
FALL_HISTORY_WITHIN_LAST_SIX_MONTHS: NO
EATING: 0 - INDEPENDENT
SWALLOWING: 0 - SWALLOWS FOODS/LIQUIDS WITHOUT DIFFICULTY
GROOMING: INDEPENDENT
DRESS: 0 - INDEPENDENT
TOILETING: 0 - INDEPENDENT
SWALLOWING: 0-->SWALLOWS FOODS/LIQUIDS WITHOUT DIFFICULTY
DRESS: 0-->INDEPENDENT
TRANSFERRING: 0 - INDEPENDENT
ORAL_HYGIENE: INDEPENDENT
BATHING: 0-->INDEPENDENT
LAUNDRY: WITH SUPERVISION

## 2018-06-12 ASSESSMENT — ENCOUNTER SYMPTOMS
DIZZINESS: 0
HALLUCINATIONS: 0
FEVER: 0
DYSPHORIC MOOD: 1
BACK PAIN: 0
CHEST TIGHTNESS: 0
SHORTNESS OF BREATH: 0
NERVOUS/ANXIOUS: 1
ABDOMINAL PAIN: 0

## 2018-06-12 NOTE — ED PROVIDER NOTES
History     Chief Complaint   Patient presents with     Suicidal     States she is SI.  Has a few different plans.  MVC. going to a gun range.     The history is provided by the patient and medical records.     Rachel Danielle is a 23 year old female who comes in due to her worsening depression and anxiety.  This has led to suicidal thoughts the last 3 days.  She has thoughts of getting into an MVA or going to a gun range and renting a gun.  She has been looking up on how to do this the last few days.  She has been more irritable, crying more and has little motivation. She has thoughts of hopelessness and worthlessness.  She has been using marijuana daily for 2 years.  She states she was using it to treat nausea.  She does not know what the nausea was from.  She currently is not in therapy or taking medications. She has no mental health history.    Please see the 's assessment in EPIC from today (6/12/18) for further details.    I have reviewed the Medications, Allergies, Past Medical and Surgical History, and Social History in the Epic system.    Review of Systems   Constitutional: Negative for fever.   Eyes: Negative for visual disturbance.   Respiratory: Negative for chest tightness and shortness of breath.    Cardiovascular: Negative for chest pain.   Gastrointestinal: Negative for abdominal pain.   Musculoskeletal: Negative for back pain.   Neurological: Negative for dizziness.   Psychiatric/Behavioral: Positive for dysphoric mood and suicidal ideas. Negative for hallucinations and self-injury. The patient is nervous/anxious.    All other systems reviewed and are negative.      Physical Exam   BP: 145/83  Pulse: 91  Temp: 99  F (37.2  C)  Resp: 16  Weight: 70.1 kg (154 lb 7 oz)  SpO2: 100 %      Physical Exam   Constitutional: She is oriented to person, place, and time. She appears well-developed and well-nourished.   HENT:   Head: Normocephalic and atraumatic.   Mouth/Throat: Oropharynx is  clear and moist.   Eyes: Pupils are equal, round, and reactive to light.   Neck: Normal range of motion. Neck supple.   Cardiovascular: Normal rate, regular rhythm and normal heart sounds.    Pulmonary/Chest: Effort normal and breath sounds normal.   Abdominal: Soft. Bowel sounds are normal.   Musculoskeletal: Normal range of motion.   Neurological: She is alert and oriented to person, place, and time.   Skin: Skin is warm and dry.   Psychiatric: Her speech is normal and behavior is normal. Judgment normal. She is not actively hallucinating. Thought content is not paranoid and not delusional. Cognition and memory are normal. She exhibits a depressed mood. She expresses suicidal ideation. She expresses no homicidal ideation. She expresses suicidal plans. She expresses no homicidal plans.   Rachel is a 22 y/o female who looks her age.  She is well groomed with good eye contact.   Nursing note and vitals reviewed.      ED Course     ED Course     Procedures               Labs Ordered and Resulted from Time of ED Arrival Up to the Time of Departure from the ED   DRUG ABUSE SCREEN 6 CHEM DEP URINE ([facility]) - Abnormal; Notable for the following:        Result Value    Cannabinoids Qual Urine Positive (*)     All other components within normal limits   HCG QUALITATIVE URINE            Assessments & Plan (with Medical Decision Making)   Rachel will be admitted to the hospital due to her worsening depression, suicidal thoughts with a plan and the inability to be safe at this time.  She will go to station 30 under Dr. Hinojosa.    I have reviewed the nursing notes.    I have reviewed the findings, diagnosis, plan and need for follow up with the patient.    New Prescriptions    No medications on file       Final diagnoses:   Depression, unspecified depression type       6/12/2018   [facility], [place], EMERGENCY DEPARTMENT     Fred Salomon MD  06/12/18 [id number]

## 2018-06-12 NOTE — IP AVS SNAPSHOT
[address]    [address]    [place] [address]    Phone:  [phone]                                       After Visit Summary   6/12/2018    Rachel Danielle    MRN: [id number]           After Visit Summary Signature Page     I have received my discharge instructions, and my questions have been answered. I have discussed any challenges I see with this plan with the nurse or doctor.    ..........................................................................................................................................  Patient/Patient Representative Signature      ..........................................................................................................................................  Patient Representative Print Name and Relationship to Patient    ..................................................               ................................................  Date                                            Time    ..........................................................................................................................................  Reviewed by Signature/Title    ...................................................              ..............................................  Date                                                            Time

## 2018-06-12 NOTE — ED NOTES
ED to Behavioral Floor Handoff    SITUATION  Rachel Danielle is a 23 year old female who speaks English and lives in a home with others The patient arrived in the ED by private car from home with a complaint of Suicidal (States she is SI.  Has a few different plans.  MVC. going to a gun range.)  .The patient's current symptoms started/worsened 1 day(s) ago and during this time the symptoms have increased.   In the ED, pt was diagnosed with   Final diagnoses:   Depression, unspecified depression type   Marijuana use        Initial vitals were: BP: 145/83  Pulse: 91  Temp: 99  F (37.2  C)  Resp: 16  Weight: 70.1 kg (154 lb 7 oz)  SpO2: 100 %   --------  Is the patient diabetic? No   If yes, last blood glucose? --     If yes, was this treated in the ED? --  --------  Is the patient inebriated (ETOH) No or Impaired on other substances? No  MSSA done? No  Last MSSA score: --    Were withdrawal symptoms treated? No  Does the patient have a seizure history? No. If yes, date of most recent seizure--  --------  Is the patient patient experiencing suicidal ideation? denies current or recent suicidal ideation     Homicidal ideation? denies current or recent homicidal ideation or behaviors.    Self-injurious behavior/urges? denies current or recent self injurious behavior or ideation.  ------  Was pt aggressive in the ED No  Was a code called No  Is the pt now cooperative? Yes  -------  Meds given in ED: Medications - No data to display   Family present during ED course? Yes  Family currently present? Yes    BACKGROUND  Does the patient have a cognitive impairment or developmental disability? No  Allergies:   Allergies   Allergen Reactions     Tree Nuts [Nuts] Swelling     Throat and tongue swell up.   .   Social demographics are   Social History     Social History     Marital status: Single     Spouse name: N/A     Number of children: N/A     Years of education: N/A     Social History Main Topics     Smoking status:  Current Some Day Smoker     Smokeless tobacco: Never Used     Alcohol use No     Drug use: No      Comment: States she stoped smoking 2 days ago.     Sexual activity: Yes     Partners: Male     Birth control/ protection: Condom     Other Topics Concern     None     Social History Narrative        ASSESSMENT  Labs results   Labs Ordered and Resulted from Time of ED Arrival Up to the Time of Departure from the ED   DRUG ABUSE SCREEN 6 CHEM DEP URINE ([facility]) - Abnormal; Notable for the following:        Result Value    Cannabinoids Qual Urine Positive (*)     All other components within normal limits   HCG QUALITATIVE URINE      Imaging Studies: No results found for this or any previous visit (from the past 24 hour(s)).   Most recent vital signs /83  Pulse 91  Temp 99  F (37.2  C) (Oral)  Resp 16  Wt 70.1 kg (154 lb 7 oz)  LMP 06/07/2018  SpO2 100%  Breastfeeding? No  BMI 26.51 kg/m2   Abnormal labs/tests/findings requiring intervention:---   Pain control: pt had none  Nausea control: pt had none    RECOMMENDATION  Are any infection precautions needed (MRSA, VRE, etc.)? No If yes, what infection? --  ---  Does the patient have mobility issues? independently. If yes, what device does the pt use? ---  ---  Is patient on 72 hour hold or commitment? No If on 72 hour hold, have hold and rights been given to patient? N/A  Are admitting orders written if after 10 p.m. ?N/A  Tasks needing to be completed:---     Andrea Antonio   ascom-- [phone] [place] [facility]   [phone] [facility] ED

## 2018-06-12 NOTE — PHARMACY-ADMISSION MEDICATION HISTORY
Admission Medication History status for the 6/12/2018 admission is complete.  See EPIC admission navigator for Prior to Admission medications.    Medication history sources:  Patient     Medication history source reliability: Good    Medication adherence:  Good    Changes made to PTA medication list (reason)  Added: None  Deleted: Ibuprofen, Zofran, and Oxycodone  Changed: None    Additional medication history information (including reliability of information, actions taken by pharmacist):   -Pt reports taking no medications.   -She was recently prescribed ibuprofen, zofran, and oxycodone prn for an acute event but has finished these and is no longer taking.     Time spent in this activity: 15 minutes     Medication history completed by: Carlene Thompson, Pharm.D.    Prior to Admission medications    Not on File

## 2018-06-12 NOTE — ED NOTES
I have performed an in person assessment of the patient. Based on this assessment the patient no longer requires a one on one attendant at this point in time.    Lukas Romero MD  10:51 AM  June 12, 2018           Lukas Romero MD  06/12/18 1051

## 2018-06-12 NOTE — PROGRESS NOTES
06/12/18 1704   Patient Belongings   Did you bring any home meds/supplements to the hospital?  Yes   Patient Belongings cell phone/electronics;purse;shoes   Disposition of Belongings Locker;Kept with patient   Belongings Search Yes   Clothing Search Yes   Second Staff Susana     Items sent to security - Security envelope [phone]  [place] license  [employer] gift card  [employer] visa [id number]  [employer] visa debit [phone]  [employer] gift card  x2 social security card  Birth certificate    ( kept with pt ) Pt was given 1 pair black pants and grey t - shirt    Locker 7   Purse - contents - wallet misc cards, cell phone , , make up  Plastic bag - shoes    A               Admission:  I am responsible for any personal items that are not sent to the safe or pharmacy.  [facility] is not responsible for loss, theft or damage of any property in my possession.    Signature:  _________________________________ Date: _______  Time: _____                                              Staff Signature:  ____________________________ Date: ________  Time: _____      2nd Staff person, if patient is unable/unwilling to sign:    Signature: ________________________________ Date: ________  Time: _____     Discharge:  [facility] has returned all of my personal belongings:    Signature: _________________________________ Date: ________  Time: _____                                          Staff Signature:  ____________________________ Date: ________  Time: _____
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
## 2018-06-12 NOTE — IP AVS SNAPSHOT
MRN:[id number]                      After Visit Summary   6/12/2018    Rachel Danielle    MRN: [id number]           Thank you!     Thank you for choosing [facility] for your care. Our goal is always to provide you with excellent care.        Patient Information     Date Of Birth          1995        Designated Caregiver       Most Recent Value    Caregiver    Will someone help with your care after discharge? yes    Name of designated caregiver Faith    Phone number of caregiver see chart    Caregiver address see chart      About your hospital stay     You were admitted on:  June 12, 2018 You last received care in the:  [facility] 30[facility]    You were discharged on:  Kayla 15, 2018       Who to Call     For medical emergencies, please call 911.  For non-urgent questions about your medical care, please call your primary care provider or clinic, None          Attending Provider     Provider Specialty    Fred Salomon MD Emergency Medicine    Shiv Hinojosa MD Psychiatry       Primary Care Provider Fax #    Physician No Ref-Primary [phone]      Further instructions from your care team        Behavioral Discharge Planning and Instructions      Summary:  You were admitted on 6/12/2018  due to Depression and suicidal ideation.  You were treated by Dr. Shiv Hinojosa MD and discharged on 6/15/2018 from [facility] to Home    Principal Diagnosis:   1.  Major depressive disorder, recurrent, severe.   2.  Rule out bipolar disorder type II.   3.  Generalized anxiety disorder.   4.  Cannabis use disorder, moderate    Health Care Follow-up Appointments:   Psychiatry:   Appointment: Wednesday June 20 at 10:10 am  (Arrive 30 minutes early for paperwork)      Provider: Cara Lee DNP  Therapy:   Provider:  Carlos Cabello  Appointment: Tuesday June 26 at 1:00 pm  [facility]  [address] [facility] [place] [address]  Phone:[phone]  Fax: [phone]  [facility] to send  "discharge summary    Primary Care Follow-up  Provider: Dr. Kraus  Appointment: Thursday, June 21 at 1:50 pm  [facility]  [address] [place] [address]  Phone: [phone]  Fax: [phone]    Attend all scheduled appointments with your outpatient providers. Call at least 24 hours in advance if you need to reschedule an appointment to ensure continued access to your outpatient providers.   Major Treatments, Procedures and Findings:  You were provided with: a psychiatric assessment, assessed for medical stability, medication evaluation and/or management, group therapy and milieu management    Symptoms to Report: feeling more aggressive, increased confusion, losing more sleep, mood getting worse or thoughts of suicide    Early warning signs can include: increased depression or anxiety sleep disturbances increased thoughts or behaviors of suicide or self-harm  increased unusual thinking, such as paranoia or hearing voices    Safety and Wellness:  Take all medicines as directed.  Make no changes unless your doctor suggests them.   Follow treatment recommendations.  Refrain from alcohol and non-prescribed drugs.  If there is a concern for safety, call 911.    Resources:   Crisis Intervention: 383.290.2437 or 457-481-7629 (TTY: 522.333.7153).  Call anytime for help.  National [place] on Mental Illness (www.[place].org): 463.789.7322 or 903-136-5909.  Suicide Awareness Voices of Education (SAVE) (www.save.org): 601-518-DOMP (1583)  National Suicide Prevention Line (www.mentalhealthmn.org): 091-934-PPSM (1602)  Mental Health Consumer/Survivor Network of [place] (www.mhcsn.net): 162.555.5412 or 524-260-2288  Mental Health Association of [place] (www.mentalhealth.org): 494.837.5311 or 071-555-1635  Self- Management and Recovery Training., SMART-- Toll free: 597.966.2632  www.[place].com  [facility] Crisis (COPE) Response - Adult 440 910-0731  Text 4 Life: txt \"LIFE\" to 27637 for immediate support and crisis " "intervention  Crisis text line: Text \"[place]\" to 779461. Free, confidential, .  Crisis Intervention: 171.495.4556 or 695-353-8537. Call anytime for help.     The treatment team has appreciated the opportunity to work with you.     If you have any questions or concerns our unit number is [phone]  You may be receiving a follow-up phone call within the next three days from a representative from behavioral health.    You have identified the best phone number to reach you as [phone]          Pending Results     No orders found from 6/10/2018 to 2018.            Admission Information     Date & Time Provider Department Dept. Phone    2018 Shiv Hinojosa MD [facility] 30[facility] [phone]      Your Vitals Were     Blood Pressure Pulse Temperature Respirations Height Weight    142/101 97 97.6  F (36.4  C) 16 1.626 m (5' 4\") 69.9 kg (154 lb)    Last Period Pulse Oximetry BMI (Body Mass Index)             2018 97% 26.43 kg/m2         [employer]ith[facility] Information     SPHARES lets you send messages to your doctor, view your test results, renew your prescriptions, schedule appointments and more. To sign up, go to [email or web address] . Click on \"Log in\" on the left side of the screen, which will take you to the Welcome page. Then click on \"Sign up Now\" on the right side of the page.     You will be asked to enter the access code listed below, as well as some personal information. Please follow the directions to create your username and password.     Your access code is: [email or web address]  Expires: 2018  2:02 PM     Your access code will  in 90 days. If you need help or a new code, please call your [facility] clinic or [phone].        Care EveryWhere ID     This is your Care EveryWhere ID. This could be used by other organizations to access your [facility] medical records  [id number]        Equal Access to Services     ELIZABETH FLORES AH: Jake Dorsey, digna manuel, rabia parsons " rand messersaul taylor'aan ah. So [facility] [phone].    ATENCIÓN: Si habla fredo, tiene a liu disposición servicios gratuitos de asistencia lingüística. Chase al [phone].    We comply with applicable federal civil rights laws and [place] laws. We do not discriminate on the basis of race, color, national origin, age, disability, sex, sexual orientation, or gender identity.               Review of your medicines      START taking        Dose / Directions    ARIPiprazole 2 MG tablet   Commonly known as:  ABILIFY   Used for:  Major depressive disorder, recurrent episode, moderate (H)        Dose:  2 mg   Take 1 tablet (2 mg) by mouth daily   Quantity:  30 tablet   Refills:  0       FLUoxetine 10 MG capsule   Commonly known as:  PROzac   Used for:  Major depressive disorder, recurrent episode, moderate (H)        Dose:  10 mg   Take 1 capsule (10 mg) by mouth daily   Quantity:  30 capsule   Refills:  0            Where to get your medicines      These medications were sent to [facility] [address]  [address] [address]     Phone:  [phone]     ARIPiprazole 2 MG tablet    FLUoxetine 10 MG capsule                Protect others around you: Learn how to safely use, store and throw away your medicines at www.disposemymeds.org.             Medication List: This is a list of all your medications and when to take them. Check marks below indicate your daily home schedule. Keep this list as a reference.      Medications           Morning Afternoon Evening Bedtime As Needed    ARIPiprazole 2 MG tablet   Commonly known as:  ABILIFY   Take 1 tablet (2 mg) by mouth daily   Last time this was given:  2 mg on 6/15/2018  8:49 AM                                FLUoxetine 10 MG capsule   Commonly known as:  PROzac   Take 1 capsule (10 mg) by mouth daily   Last time this was given:  10 mg on 6/15/2018  8:49 AM
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
## 2018-06-13 LAB
ALBUMIN SERPL-MCNC: 4.1 G/DL (ref 3.4–5)
ALP SERPL-CCNC: 63 U/L (ref 40–150)
ALT SERPL W P-5'-P-CCNC: 11 U/L (ref 0–50)
ANION GAP SERPL CALCULATED.3IONS-SCNC: 10 MMOL/L (ref 3–14)
AST SERPL W P-5'-P-CCNC: 15 U/L (ref 0–45)
BASOPHILS # BLD AUTO: 0 10E9/L (ref 0–0.2)
BASOPHILS NFR BLD AUTO: 0.3 %
BILIRUB SERPL-MCNC: 0.5 MG/DL (ref 0.2–1.3)
BUN SERPL-MCNC: 10 MG/DL (ref 7–30)
CALCIUM SERPL-MCNC: 9.2 MG/DL (ref 8.5–10.1)
CHLORIDE SERPL-SCNC: 105 MMOL/L (ref 94–109)
CHOLEST SERPL-MCNC: 168 MG/DL
CO2 SERPL-SCNC: 25 MMOL/L (ref 20–32)
CREAT SERPL-MCNC: 0.76 MG/DL (ref 0.52–1.04)
DIFFERENTIAL METHOD BLD: NORMAL
EOSINOPHIL # BLD AUTO: 0.3 10E9/L (ref 0–0.7)
EOSINOPHIL NFR BLD AUTO: 5.2 %
ERYTHROCYTE [DISTWIDTH] IN BLOOD BY AUTOMATED COUNT: 14.4 % (ref 10–15)
GFR SERPL CREATININE-BSD FRML MDRD: >90 ML/MIN/1.7M2
GLUCOSE SERPL-MCNC: 82 MG/DL (ref 70–99)
HCT VFR BLD AUTO: 38.2 % (ref 35–47)
HDLC SERPL-MCNC: 49 MG/DL
HGB BLD-MCNC: 12.1 G/DL (ref 11.7–15.7)
IMM GRANULOCYTES # BLD: 0 10E9/L (ref 0–0.4)
IMM GRANULOCYTES NFR BLD: 0.2 %
LDLC SERPL CALC-MCNC: 106 MG/DL
LYMPHOCYTES # BLD AUTO: 1.5 10E9/L (ref 0.8–5.3)
LYMPHOCYTES NFR BLD AUTO: 25.7 %
MCH RBC QN AUTO: 26.8 PG (ref 26.5–33)
MCHC RBC AUTO-ENTMCNC: 31.7 G/DL (ref 31.5–36.5)
MCV RBC AUTO: 85 FL (ref 78–100)
MONOCYTES # BLD AUTO: 0.5 10E9/L (ref 0–1.3)
MONOCYTES NFR BLD AUTO: 8.1 %
NEUTROPHILS # BLD AUTO: 3.6 10E9/L (ref 1.6–8.3)
NEUTROPHILS NFR BLD AUTO: 60.5 %
NONHDLC SERPL-MCNC: 119 MG/DL
NRBC # BLD AUTO: 0 10*3/UL
NRBC BLD AUTO-RTO: 0 /100
PLATELET # BLD AUTO: 375 10E9/L (ref 150–450)
POTASSIUM SERPL-SCNC: 3.7 MMOL/L (ref 3.4–5.3)
PROT SERPL-MCNC: 8 G/DL (ref 6.8–8.8)
RBC # BLD AUTO: 4.51 10E12/L (ref 3.8–5.2)
SODIUM SERPL-SCNC: 140 MMOL/L (ref 133–144)
TRIGL SERPL-MCNC: 65 MG/DL
TSH SERPL DL<=0.005 MIU/L-ACNC: 0.74 MU/L (ref 0.4–4)
WBC # BLD AUTO: 5.9 10E9/L (ref 4–11)

## 2018-06-13 PROCEDURE — 25000132 ZZH RX MED GY IP 250 OP 250 PS 637: Performed by: NURSE PRACTITIONER

## 2018-06-13 PROCEDURE — 25000132 ZZH RX MED GY IP 250 OP 250 PS 637: Performed by: PSYCHIATRY & NEUROLOGY

## 2018-06-13 PROCEDURE — 85025 COMPLETE CBC W/AUTO DIFF WBC: CPT | Performed by: NURSE PRACTITIONER

## 2018-06-13 PROCEDURE — 12400007 ZZH R&B MH INTERMEDIATE UMMC

## 2018-06-13 PROCEDURE — 80053 COMPREHEN METABOLIC PANEL: CPT | Performed by: NURSE PRACTITIONER

## 2018-06-13 PROCEDURE — 99223 1ST HOSP IP/OBS HIGH 75: CPT | Mod: AI | Performed by: PSYCHIATRY & NEUROLOGY

## 2018-06-13 PROCEDURE — 80061 LIPID PANEL: CPT | Performed by: NURSE PRACTITIONER

## 2018-06-13 PROCEDURE — 90853 GROUP PSYCHOTHERAPY: CPT

## 2018-06-13 PROCEDURE — 84443 ASSAY THYROID STIM HORMONE: CPT | Performed by: NURSE PRACTITIONER

## 2018-06-13 PROCEDURE — 36415 COLL VENOUS BLD VENIPUNCTURE: CPT | Performed by: NURSE PRACTITIONER

## 2018-06-13 RX ORDER — FLUOXETINE 10 MG/1
10 CAPSULE ORAL DAILY
Status: DISCONTINUED | OUTPATIENT
Start: 2018-06-13 | End: 2018-06-15 | Stop reason: HOSPADM

## 2018-06-13 RX ADMIN — ALUMINUM HYDROXIDE, MAGNESIUM HYDROXIDE, AND DIMETHICONE 30 ML: 400; 400; 40 SUSPENSION ORAL at 21:16

## 2018-06-13 RX ADMIN — IBUPROFEN 600 MG: 600 TABLET ORAL at 17:48

## 2018-06-13 RX ADMIN — HYDROXYZINE HYDROCHLORIDE 25 MG: 25 TABLET ORAL at 21:16

## 2018-06-13 RX ADMIN — FLUOXETINE 10 MG: 10 CAPSULE ORAL at 15:06

## 2018-06-13 ASSESSMENT — ACTIVITIES OF DAILY LIVING (ADL)
LAUNDRY: WITH SUPERVISION
ORAL_HYGIENE: INDEPENDENT
DRESS: INDEPENDENT;SCRUBS (BEHAVIORAL HEALTH)
LAUNDRY: UNABLE TO COMPLETE
DRESS: SCRUBS (BEHAVIORAL HEALTH)
ORAL_HYGIENE: INDEPENDENT
GROOMING: INDEPENDENT;SHOWER;HANDWASHING
HYGIENE/GROOMING: INDEPENDENT;SHOWER

## 2018-06-13 NOTE — PROGRESS NOTES
Behavioral Health  Note   Behavioral Health  Spirituality Group Note     [facility]    Name: Rachel Danielle    YOB: 1995   MRN: [id number]    Age: 23 year old     Patient attended -led group, which included discussion of spirituality, coping with illness and building resilience.   Patient attended group for 1.0 hrs.   The patient actively participated in group discussion     [facility]  Staff    Page [phone]

## 2018-06-13 NOTE — PLAN OF CARE
Problem: Depressive Symptoms  Goal: Social and Therapeutic (Depression)  Signs and symptoms of listed problems will be absent or manageable.     Rachel participated in 2/2 OT groups.  Group 1 utilized a structured group task to facilitate positive social interaction while promote gratitude practice and problem solving. Group closed with a guided meditation and emphasis on setting an intention for the day to explore various types of perspective change and coping skills. She was an active participant and demonstrated bright affect and laughter. She participated in guided meditation exercise but had a skeptical affect. She did not share her experience. Group 2 utilized structured tasks to facilitate cognitive skills and promote problem solving, decision making, trying new things, and experiencing success in a supportive social environment to improve self-esteem in treatment. She selected a goal-directed task and worked with concentration and organization. She was social with her tablemates and engaged in conversation while working.

## 2018-06-13 NOTE — H&P
"Admitted:     06/12/2018      IDENTIFYING INFORMATION:  The patient is a 23-year-old single employed  female who currently resides in a shared home with her boyfriend and a few others.      CHIEF COMPLAINT:  \"I've finally got to where I was crying and having really bad thoughts and so I thought I should come in and get help.\"      HISTORY OF PRESENT ILLNESS:  The patient has a history of depression and anxiety who presented voluntarily to the emergency room reporting recent worsening of mood symptoms and suicidal ideation.  She was agreeable for voluntary hospitalization.  Her urine drug screen on admission was positive for cannabis.  On examination today, the patient explains that over the past many years she has battled variations in her mood typically maintaining a baseline depressed mood; however, has frequently experienced moments where her mood elevates to extreme happiness.  During those moments of mood elevation, friends and family members are able to make note of the mood change questioning whether she is under the influence of a substance and bringing attention to her out of character behaviors.  She describes herself as being more impulsive, talkative, physically active and goal directed during those moments.  She adds that the majority of the time she feels quite depressed and isolates to herself and avoids social contact due to overt anxiety.  More recently she had decided to discontinue cannabis usage which she was previously using to self-medicate these symptoms.  After discontinuing usage, she noticed that her mood symptoms became more prominent as she no longer had a way of regulating these symptoms.  She was having more frequent episodes of crying and mixed with moments of irritability, resulting in arguments with her boyfriend.  On the day of her admission, she was experiencing suicidal thoughts to drive her car to an unknown location and overdose on medications.  She decided to come to " the emergency room instead to seek help.      PSYCHIATRIC REVIEW OF SYSTEMS:  Regarding depressive episode, mood is depressed, characterized as severe, accompanied with low energy, low appetite, low concentration, anhedonia, feeling helpless, hopeless.  Suicidal thoughts are present, however, she is able to contract for safety on the unit.  No homicidal thoughts reported.  She has missed a few days at work due to the severity of her depressive symptoms.  Regarding symptoms of karen, she identified moments of elevated mood, accompanied with irritability on occasion with prominent psychomotor level increase, talkativeness, racing thoughts, decreased need for sleep and goal-directed behaviors.  People have inquired whether she is high on a substance during these moments, which she assures me she is not.  No associated psychosis or independent psychosis.  She described ongoing anxiety characterizing herself as a worrier in excess of her peers with increased discomfort in social settings.  She feels she is negatively critiqued by others.  No panic attacks reported.  No symptoms associated with PTSD, although she has experienced past trauma.  No symptoms corresponding to eating disorder or OCD.      PAST PSYCHIATRIC HISTORY:  She has been seeing a counselor off and on since an adolescent.  She has not seen a therapist for some time.  She has never taken psychotropic medications and currently does not have any outpatient mental health providers.  This is her first inpatient hospitalization.  She identified 1 prior suicide attempt around the age of 13 where she had overdosed on an unknown amount of an unspecified medicine after which she did not seek medical attention.  No history of self-injurious behavior reported.      SUBSTANCE ABUSE HISTORY:  Cannabis usage on a daily basis over the past 2 years in variable amounts; however, discontinued usage 3 days prior to her arrival.  She reported minimal alcohol usage with none  recent.  No illicit substance usage recently, however, she did endorse experimenting with hallucinogens naming mushrooms and LSD in the past.  Nicotine usage was described as 1 or 2 cigarettes every few days.  No history of detox treatments or DUIs or DWIs.      PAST MEDICAL HISTORY:  No active issues.  However, she adds that she has been seen in the emergency room several times for vague GI symptoms; however, unrelated to any specific medical condition.      FAMILY HISTORY:  Biological mother with an unknown mental health condition.  She suspects it may be a bipolar disorder as her mother has taken mood stabilizers and antianxiety medications.  No knowledge of suicides in the family.      SOCIAL HISTORY:  Refer to the psychosocial assessment completed by our .  She reports having an unstable childhood with poor relations with her parents.  Her father had left early in her life and she maintained intermittent contact with him until he passed away a few years ago.  Her relationship with her mother was also sporadic.  She was eventually placed voluntarily out of home to be raised by her cousins, however, interprets that she raised herself since the age of 8.  She vaguely states she encountered various traumatic experiences of which she does not voluntarily go into detail.  She is currently residing with her boyfriend and a few others and feels that her current living arrangement is supportive, however, too small for the amount of people who live there.  She is employed in a dog sitting business.  No legal issues reported.      MEDICAL REVIEW OF SYSTEMS:  Ten-point systems were reviewed and were positive for psychiatric symptoms as noted above, otherwise negative.      PHYSICAL EXAMINATION:   VITAL SIGNS:  Blood pressure is 135/87, pulse 87, temperature 99.3, respirations 18, weight is 154 pounds.      The rest of the physical examination was reviewed in the emergency room documentation.      MENTAL STATUS  EXAMINATION:  The patient appears her stated age, appropriately dressed, fair hygiene, calm and cooperative, accompanied me to the interview room, sat in the chair, no psychomotor abnormalities.  Eye contact was poor as she mostly looked to the floor with very rare glances in my direction.  Her speech was normal, not pushed or pressured.  She elaborated adequately and fluently and spontaneously.  Speech was soft in volume, normal in rate and tone.  Mood is described as being depressed.  Affect was blunted.  Thought process was linear and logical.  Associations were intact.  Thought content did not display evidence of psychosis.  She denied auditory and visual hallucinations.  She endorsed passive suicidal thoughts, no active plan or intent.  No homicidal thoughts reported.  Insight and judgment appear fair.  Cognition appeared intact to interviewing including orientation to person, place, time and situation, use of language, fund of knowledge, recent and remote memory.  Muscle strength, tone and gait appear normal on visual inspection.      ASSESSMENT:   1.  Major depressive disorder, recurrent, severe.   2.  Rule out bipolar disorder type II.   3.  Generalized anxiety disorder.   4.  Cannabis use disorder, moderate.      PLAN:   1.  The patient has been admitted to our Behavioral Health Unit voluntarily for depressed mood and suicidal ideation.  Treatment will be continued voluntarily.   2.  Noting that the patient reports some symptoms suspicious of a possible bipolar disorder; however, has been in the setting of illicit substance usage involving cannabis, we will continue to closely monitor for the possibility of a bipolar disorder being present as she achieves a meaningful period of sobriety.  We decided to pursue treatment to allow coverage of this possibility by utilizing a mood stabilizer in conjunction with an antidepressant.  The patient was agreeable to start fluoxetine to be titrated up to 20 mg daily if  tolerated well.  She adds that her mother has responded well to that medication as well.  Abilify will be started at 2 mg tomorrow if she tolerates the addition of fluoxetine well to allow for mood stabilization coverage.  Risks, benefits and side effects of these medications were reviewed.   3.  Labs on admission were reviewed including drug screen positive for cannabis.  Pregnancy test negative.  CBC, CMP and TSH unremarkable.   4.  Psychosocial treatments to be addressed with social work consult and groups.  She would benefit from a referral for individual psychotherapy as well as outpatient medication management which she is agreeable to.   5.  Anticipate a hospital stay of 3-5 days as we target improvement in mood, remission of suicidal thoughts while formulating a plan of care to help transition her care to outpatient providers.         AZALIA HAYES MD             D: 2018   T: 2018   MT: MINA      Name:     MARICHUY LR   MRN:      [id number]        Account:      [id number]   :      1995        Admitted:     2018                   Document: [id number]

## 2018-06-13 NOTE — PROGRESS NOTES
06/13/18 1400   Behavioral Health   Hallucinations other (see comment)   Thinking intact   Orientation person: oriented;place: oriented   Memory baseline memory   Insight insight appropriate to events;insight appropriate to situation   Judgement intact   Affect blunted, flat   Mood mood is calm   1. Wish to be Dead No   2. Non-Specific Active Suicidal Thoughts  No   Activities of Daily Living   Hygiene/Grooming independent;shower   Oral Hygiene independent   Dress scrubs (behavioral health)   Laundry unable to complete   Room Organization independent   Pt was social with others and staff, participated in all groups offered and was overall in a positive mood. She told staff that she has never been on medications before and today, she was prescribed some medications that should help with her illness, and she is looking forward to see what they will do for her.
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
## 2018-06-14 PROCEDURE — 12400007 ZZH R&B MH INTERMEDIATE UMMC

## 2018-06-14 PROCEDURE — 90853 GROUP PSYCHOTHERAPY: CPT

## 2018-06-14 PROCEDURE — 25000132 ZZH RX MED GY IP 250 OP 250 PS 637: Performed by: PSYCHIATRY & NEUROLOGY

## 2018-06-14 PROCEDURE — 99239 HOSP IP/OBS DSCHRG MGMT >30: CPT | Performed by: PSYCHIATRY & NEUROLOGY

## 2018-06-14 RX ORDER — ONDANSETRON 4 MG/1
4 TABLET, FILM COATED ORAL EVERY 6 HOURS PRN
Status: DISCONTINUED | OUTPATIENT
Start: 2018-06-14 | End: 2018-06-15 | Stop reason: HOSPADM

## 2018-06-14 RX ORDER — LOPERAMIDE HCL 2 MG
2-4 CAPSULE ORAL 3 TIMES DAILY PRN
Status: DISCONTINUED | OUTPATIENT
Start: 2018-06-14 | End: 2018-06-15 | Stop reason: HOSPADM

## 2018-06-14 RX ORDER — ARIPIPRAZOLE 2 MG/1
2 TABLET ORAL DAILY
Qty: 30 TABLET | Refills: 0 | Status: ON HOLD | OUTPATIENT
Start: 2018-06-15 | End: 2020-08-08

## 2018-06-14 RX ORDER — FLUOXETINE 10 MG/1
10 CAPSULE ORAL DAILY
Qty: 30 CAPSULE | Refills: 0 | Status: ON HOLD | OUTPATIENT
Start: 2018-06-15 | End: 2020-08-08

## 2018-06-14 RX ADMIN — FLUOXETINE 10 MG: 10 CAPSULE ORAL at 08:55

## 2018-06-14 RX ADMIN — Medication 2 MG: at 11:43

## 2018-06-14 ASSESSMENT — ACTIVITIES OF DAILY LIVING (ADL)
LAUNDRY: WITH SUPERVISION
ORAL_HYGIENE: INDEPENDENT
GROOMING: INDEPENDENT
DRESS: INDEPENDENT;STREET CLOTHES

## 2018-06-14 NOTE — PLAN OF CARE
Problem: Depressive Symptoms  Goal: Depressive Symptoms  Signs and symptoms of listed problems will be absent or manageable.   Outcome: Improving  Patient displays flat affect and had poor eye contact during 1:1, looked at floor during conversation. Affect blunted but did brighten upon staff approach. States mood has been improving and denies safety concerns. Is adjusting well to new medications and has been compliant with medications. Patient has been attending all groups with participation. Appetite has improved and patient states it is better than past week. Went over d/c appointments and plan.

## 2018-06-14 NOTE — DISCHARGE SUMMARY
[facility]  Department of Psychiatry    DATE OF ADMISSION:  6/12/2018    DATE OF DISCHARGE:  Kayla 15, 2018    DISCHARGE DIAGNOSES:   1.  Major depressive disorder, recurrent, severe.        Rule out bipolar disorder type II.   2.  Generalized anxiety disorder.   3.  Cannabis use disorder, moderate.     HOSPITAL COURSE: (Refer to H&P, progress notes, and consult notes for details)    The patient was admitted to our Behavioral Health Unit under voluntary status for depressed mood and suicidal ideation.  She reported some symptoms that raised suspicion for the possibility of a bipolar disorder although her diagnosis was complicated by ongoing cannabis usage.  She was started on a combination of Prozac and Abilify with Abilify allowing mood stabilization coverage until her diagnosis can be further clarified by ongoing evaluation with her outpatient provider.  She responded well to the medications and gained improvement in mood, reduction of anxiety, and elimination of suicidal thoughts.  Noting these improvements, her care was transitioned to outpatient providers.    CONDITION AT DISCHARGE:  Improved.  The patients acute suicide risk is low due to the following factors:  improved mood/anxiety symptoms.  Denies suicidal ideations. Denies psychotic symptoms.  Not actively intoxicated and plans to abstain from illicit substances and alcohol.  Denies access to guns.  Denies feeling hopeless or helpless. At the time of discharge Rachel Danielle was determined to not be an immediate danger to herself or others. The patient's acute risk will be higher if noncompliant with treatment plan, medications, follow-up or using illicit substances or alcohol.  These findings along with the risks of noncompliance with medications and treatment plan, which could potentially cause decompensation and increase the risk for suicide, were discussed with the patient.  The patients chronic suicide  risk is moderate given the following factors: past suicide attempt; white race; diagnosis of MDD, ?Bipolar disorder, Denied a family history of suicide.  Preventative factors include: social supports, stable housing     MENTAL STATUS EXAMINATION AT TIME OF DISCHARGE:  The patient is 23 year old White female who appears their stated age and is appropriately dressed with good hygiene.  Calm and cooperative with the interview questions.  No psychomotor abnormalities are noted. Eye contact is appropriate. Speech has normal rate, tone, latency and volume and is not pushed or pressured. Mood is euthymic and affect is full and appropriate.  The patient does not seem overtly depressed, anxious, manic or irritable.  Thought process is linear, logical and future oriented.  Thought process is not tangential, circumstantial or disorganized.  Thought content is not significant for apperant paranoia, delusions, ideas of reference or grandiosity.  The patient denies suicidal and homicidal ideations as well as auditory and visual hallucinations.  Insight and judgment are fair.  Cognition appears intact to interviewing including orientation, recent and remote memory, fund of knowledge, use of language, attention span and concentration.  Muscle strength, tone and gait appear normal on visual inspection.      DISPOSITION:  The patient is discharged home     FOLLOWUP APPOINTMENTS:  ( per social workers notes and after visit summary)  1.  [facility]  2.  [facility] care appointment [facility] on June 21    DISCHARGE MEDICATIONS:   Current Discharge Medication List      START taking these medications    Details   ARIPiprazole (ABILIFY) 2 MG tablet Take 1 tablet (2 mg) by mouth daily  Qty: 30 tablet, Refills: 0    Associated Diagnoses: Major depressive disorder, recurrent episode, moderate (H)      FLUoxetine (PROZAC) 10 MG capsule Take 1 capsule (10 mg) by  mouth daily  Qty: 30 capsule, Refills: 0    Associated Diagnoses: Major depressive disorder, recurrent episode, moderate (H)              LABORATORY RESULTS: (past 14 days)  Recent Results (from the past 336 hour(s))   Drug abuse screen 6 urine (tox)    Collection Time: 06/12/18 10:45 AM   Result Value Ref Range    Amphetamine Qual Urine Negative NEG^Negative    Barbiturates Qual Urine Negative NEG^Negative    Benzodiazepine Qual Urine Negative NEG^Negative    Cannabinoids Qual Urine Positive (A) NEG^Negative    Cocaine Qual Urine Negative NEG^Negative    Ethanol Qual Urine Negative NEG^Negative    Opiates Qualitative Urine Negative NEG^Negative   HCG qualitative urine    Collection Time: 06/12/18 10:45 AM   Result Value Ref Range    HCG Qual Urine Negative NEG^Negative   CBC with platelets differential    Collection Time: 06/13/18  8:03 AM   Result Value Ref Range    WBC 5.9 4.0 - 11.0 10e9/L    RBC Count 4.51 3.8 - 5.2 10e12/L    Hemoglobin 12.1 11.7 - 15.7 g/dL    Hematocrit 38.2 35.0 - 47.0 %    MCV 85 78 - 100 fl    MCH 26.8 26.5 - 33.0 pg    MCHC 31.7 31.5 - 36.5 g/dL    RDW 14.4 10.0 - 15.0 %    Platelet Count 375 150 - 450 10e9/L    Diff Method Automated Method     % Neutrophils 60.5 %    % Lymphocytes 25.7 %    % Monocytes 8.1 %    % Eosinophils 5.2 %    % Basophils 0.3 %    % Immature Granulocytes 0.2 %    Nucleated RBCs 0 0 /100    Absolute Neutrophil 3.6 1.6 - 8.3 10e9/L    Absolute Lymphocytes 1.5 0.8 - 5.3 10e9/L    Absolute Monocytes 0.5 0.0 - 1.3 10e9/L    Absolute Eosinophils 0.3 0.0 - 0.7 10e9/L    Absolute Basophils 0.0 0.0 - 0.2 10e9/L    Abs Immature Granulocytes 0.0 0 - 0.4 10e9/L    Absolute Nucleated RBC 0.0    Comprehensive metabolic panel    Collection Time: 06/13/18  8:03 AM   Result Value Ref Range    Sodium 140 133 - 144 mmol/L    Potassium 3.7 3.4 - 5.3 mmol/L    Chloride 105 94 - 109 mmol/L    Carbon Dioxide 25 20 - 32 mmol/L    Anion Gap 10 3 - 14 mmol/L    Glucose 82 70 - 99 mg/dL     Urea Nitrogen 10 7 - 30 mg/dL    Creatinine 0.76 0.52 - 1.04 mg/dL    GFR Estimate >90 >60 mL/min/1.7m2    GFR Estimate If Black >90 >60 mL/min/1.7m2    Calcium 9.2 8.5 - 10.1 mg/dL    Bilirubin Total 0.5 0.2 - 1.3 mg/dL    Albumin 4.1 3.4 - 5.0 g/dL    Protein Total 8.0 6.8 - 8.8 g/dL    Alkaline Phosphatase 63 40 - 150 U/L    ALT 11 0 - 50 U/L    AST 15 0 - 45 U/L   Lipid panel    Collection Time: 06/13/18  8:03 AM   Result Value Ref Range    Cholesterol 168 <200 mg/dL    Triglycerides 65 <150 mg/dL    HDL Cholesterol 49 (L) >49 mg/dL    LDL Cholesterol Calculated 106 (H) <100 mg/dL    Non HDL Cholesterol 119 <130 mg/dL   TSH with free T4 reflex and/or T3 as indicated    Collection Time: 06/13/18  8:03 AM   Result Value Ref Range    TSH 0.74 0.40 - 4.00 mU/L       >30 minutes was spent on this discharge to allow for reviewing the patient's response to treatment, reviewing plan of care, education on medications and diagnosis, and conducting a risk assessment.

## 2018-06-14 NOTE — PLAN OF CARE
Problem: Patient Care Overview  Goal: Team Discussion  Team Plan:   BEHAVIORAL TEAM DISCUSSION    Participants: Dr. Shiv Hinojosa MD, Jerica Lopez RN, Chio COTE  Progress: Initial evaluation  Continued Stay Criteria/Rationale: New patient admitted for Si with a paln  Medical/Physical: Defer to medical  Precautions:   Behavioral Orders   Procedures     Code 1 - Restrict to Unit     Routine Programming     As clinically indicated     Status 15     Every 15 minutes.     Plan: Psychiatric Assessment. Medication Management. Therapeutic Mileu. Individual and group support.  Rationale for change in precautions or plan: Initial plan

## 2018-06-14 NOTE — PROGRESS NOTES
"[facility] [place]   Psychiatric Progress Note         Interim History and Subjective Reports:   The patient's care was discussed with the treatment team during the daily team meeting.  Staff reports: no acute issues    Depression severity scale 0-10 (10=most severe):  Today: 4    Her mood is much better today and she feels well supported.  She is tolerating the medication fairly well which provides her with additional reassurance.  She feels comfortable starting Abilify today as we discussed.    She slept very well last night.  Energy is fair.  Appetite is normal.  She is attending groups.    She denies suicidal and homicidal thoughts today.  No psychotic symptoms reported.  She seem more future oriented and missing the comforts of home.  She was happy to know that she will be seeing the therapist early next week.         Scheduled Medications:       ARIPiprazole  2 mg Oral Daily     FLUoxetine  10 mg Oral Daily          Allergies:      Allergies   Allergen Reactions     Tree Nuts [Nuts] Swelling     Throat and tongue swell up.          Labs:   No results found for this or any previous visit (from the past 24 hour(s)).       Vitals:   BP (!) 142/101  Pulse 97  Temp 95.2  F (35.1  C) (Oral)  Resp 18  Ht 1.626 m (5' 4\")  Wt 69.9 kg (154 lb)  LMP 06/07/2018  SpO2 97%  Breastfeeding? No  BMI 26.43 kg/m2  Weight: 154 lbs 0 oz          Height: 5' 4\"           Body mass index is 26.43 kg/(m^2).        Mental Status Examination:   Appearance: awake, alert  Attitude:  cooperative  Eye Contact:  poor   Mood:  better  Affect:  appropriate and in normal range  Speech:  clear, coherent  Psychomotor Behavior:  no evidence of tardive dyskinesia, dystonia, or tics  Throught Process:  linear  Associations:  no loose associations  Thought Content:  no evidence of suicidal ideation or homicidal ideation and no evidence of psychotic thought  Insight:  fair  Judgement:  intact  Oriented to:  time, " person, and place  Attention Span and Concentration:  intact  Recent and Remote Memory:  intact         DIagnoses:     1.  Major depressive disorder, recurrent, severe.       Rule out bipolar disorder type II.   2.  Generalized anxiety disorder.   3.  Cannabis use disorder, moderate.          Plan:   1. Biological treatments:  --Continue Prozac at the current dose.  Add Abilify for augmentation and coverage for mood stabilization.    2. Psychosocial treatments:   --addressed with SW consult and groups  --Patient is not interested in formal chemical addiction treatment and feels confident about abstaining from usage as she uses her current plan of care to better address mental health symptoms as opposed to illicit substances.    3. Dispo:  --Anticipate discharge home tomorrow per the patient's request.

## 2018-06-14 NOTE — DISCHARGE INSTRUCTIONS
Behavioral Discharge Planning and Instructions      Summary:  You were admitted on 6/12/2018  due to Depression and suicidal ideation.  You were treated by Dr. Shiv Hinojosa MD and discharged on 6/15/2018 from [facility]    Principal Diagnosis:   1.  Major depressive disorder, recurrent, severe.   2.  Rule out bipolar disorder type II.   3.  Generalized anxiety disorder.   4.  Cannabis use disorder, moderate    Health Care Follow-up Appointments:   Psychiatry:   Appointment: Wednesday June 20 at 10:10 am  (Arrive 30 minutes early for paperwork)      Provider: Cara Lee DNP  Therapy:   Provider:  Carlos Cabello  Appointment: Tuesday June 26 at 1:00 pm  [facility]  [address], [place] [address]  Phone:[phone]  Fax: [phone]  [facility] to send discharge summary    Primary Care Follow-up  Provider: Dr. Kraus  Appointment: Thursday, June 21 at 1:50 pm  [facility]  [address] [place] [address]  Phone: [phone]  Fax: [phone]    Attend all scheduled appointments with your outpatient providers. Call at least 24 hours in advance if you need to reschedule an appointment to ensure continued access to your outpatient providers.   Major Treatments, Procedures and Findings:  You were provided with: a psychiatric assessment, assessed for medical stability, medication evaluation and/or management, group therapy and milieu management    Symptoms to Report: feeling more aggressive, increased confusion, losing more sleep, mood getting worse or thoughts of suicide    Early warning signs can include: increased depression or anxiety sleep disturbances increased thoughts or behaviors of suicide or self-harm  increased unusual thinking, such as paranoia or hearing voices    Safety and Wellness:  Take all medicines as directed.  Make no changes unless your doctor suggests them.   Follow treatment recommendations.  Refrain from alcohol and non-prescribed  "drugs.  If there is a concern for safety, call 911.    Resources:   Crisis Intervention: 900.862.5979 or 253-808-9197 (TTY: 118.728.5783).  Call anytime for help.  National [place] on Mental Illness (www.[place]on.org): 675.793.1766 or 665-752-4653.  Suicide Awareness Voices of Education (SAVE) (www.save.org): 155-424-ZNPS (6352)  National Suicide Prevention Line (www.mentalhealthmn.org): 737-950-CLND (4640)  Mental Health Consumer/Survivor Network of [place] (www.mhcsn.net): 939.178.1132 or 209-487-6006  Mental Health Association of [place] (www.mentalhealth.org): 207.245.8154 or 991-535-4666  Self- Management and Recovery Training., SMART-- Toll free: 886.815.1149  www.DietBetter.Znode  [facility] Crisis (COPE) Response - Adult 385 000-0535  Text 4 Life: txt \"LIFE\" to 39016 for immediate support and crisis intervention  Crisis text line: Text \"[place]\" to 601771. Free, confidential, 24/7.  Crisis Intervention: 159.520.7767 or 012-925-6871. Call anytime for help.     The treatment team has appreciated the opportunity to work with you.     If you have any questions or concerns our unit number is [phone]  You may be receiving a follow-up phone call within the next three days from a representative from behavioral health.    You have identified the best phone number to reach you as [phone]        "

## 2018-06-14 NOTE — PLAN OF CARE
Problem: Patient Care Overview  Goal: Plan of Care/Patient Progress Review  Outcome: Improving  Illness Management Recovery model:  Feedback.    Patient identified the following people they would like to receive feedback from if/when they see early warning signs:    Friend(s): 2 roommates     Partner/Spouse: boyfriend    Support Group Member(s): outpatient therapy appointments made

## 2018-06-14 NOTE — PROGRESS NOTES
"Patient's goal for the evening was to \"get through the day without crying\", however patient reported that she had cried today already due to hospitalization. Patient was calm this evening, social with others, visible in the milieu coloring and chatting with peers, making phone calls, and attended and participated in group. Patient showered this evening as well. Patient's ADL's are independent with no nutrition concerns. Patient had several friends visit this evening which she reported went well. During the latter part of the evening, patient reported feeling anxious and nauseous. Patient's nurse was addressed about these issues. Patient went to bed early this evening after PRN medications. Staff was unable to assess SI/SIB and hallucinations due to patient retiring to bed early and was unable to check-in.        06/13/18 2059   Behavioral Health   Hallucinations denies / not responding to hallucinations   Thinking intact   Orientation time: oriented;date: oriented;place: oriented;person: oriented   Memory baseline memory   Insight admits / accepts;insight appropriate to events   Judgement intact   Eye Contact at examiner   Affect blunted, flat;sad;other (see comments)  (Brighter upon interaction)   Mood mood is calm;anxious   Physical Appearance/Attire attire appropriate to age and situation;neat;appears stated age   Hygiene well groomed;other (see comment)  (Showered this evening)   Suicidality other (see comments)  (PARAG)   Self Injury other (see comment)  (PARAG)   Elopement (Patient does not appear to be a risk)   Activity other (see comment)  (Visible in milieu, groups, social with others, mealtimes)   Speech clear;coherent   Medication Sensitivity no stated side effects;no observed side effects   Psychomotor / Gait steady;balanced     "

## 2018-06-15 VITALS
BODY MASS INDEX: 26.29 KG/M2 | DIASTOLIC BLOOD PRESSURE: 101 MMHG | HEART RATE: 97 BPM | SYSTOLIC BLOOD PRESSURE: 142 MMHG | OXYGEN SATURATION: 97 % | TEMPERATURE: 97.6 F | WEIGHT: 154 LBS | RESPIRATION RATE: 16 BRPM | HEIGHT: 64 IN

## 2018-06-15 PROCEDURE — 25000132 ZZH RX MED GY IP 250 OP 250 PS 637: Performed by: PSYCHIATRY & NEUROLOGY

## 2018-06-15 PROCEDURE — H2032 ACTIVITY THERAPY, PER 15 MIN: HCPCS

## 2018-06-15 PROCEDURE — 90853 GROUP PSYCHOTHERAPY: CPT

## 2018-06-15 RX ADMIN — LOPERAMIDE HYDROCHLORIDE 2 MG: 2 CAPSULE ORAL at 09:38

## 2018-06-15 RX ADMIN — Medication 2 MG: at 08:49

## 2018-06-15 RX ADMIN — FLUOXETINE 10 MG: 10 CAPSULE ORAL at 08:49

## 2018-06-15 ASSESSMENT — ACTIVITIES OF DAILY LIVING (ADL)
GROOMING: INDEPENDENT
ORAL_HYGIENE: INDEPENDENT
DRESS: INDEPENDENT
LAUNDRY: WITH SUPERVISION

## 2018-06-15 NOTE — PROGRESS NOTES
Patient discharging 6/15/2018 accompanied by her boyfriend and destination is home.    Discharge paperwork reviewed and medications reviewed with Rachel Danielle who verbalizes understanding.     Patient has verbalized understanding of discharge instructions and medication administration.     Patient reports that she is ready for discharge. Patient has been visible in the milieu for majority of the morning. Attended groups and community meeting. Affect is flat and has poor eye contact looking at the ground the whole time. Patient denies suicidal ideation and self injurious thoughts. Patient denies depression. Reports having a little bit of anxiety. Denies auditory and visual hallucinations. Denies having access to guns. Denies homicidal ideation.     Copy of AVS signed and reviewed.     Medications from pharmacy received and signed for.     Security items received and signed for.     Locker items received and signed for.    Survey provided

## 2018-06-15 NOTE — PROGRESS NOTES
Writer conferred with pt and with Torrey Alicea re: discharge plan.  Discharge order has been placed.  Medications have been ordered but have not arrived.  Torrey alicea will check on them and discharged time has been agreed for 1:30pm today.  Writer told pt, who will arrange a  from her partner at that time.

## 2020-08-07 ENCOUNTER — HOSPITAL ENCOUNTER (OUTPATIENT)
Facility: CLINIC | Age: 25
Setting detail: OBSERVATION
Discharge: HOME | End: 2020-08-09
Attending: EMERGENCY MEDICINE | Admitting: SURGERY
Payer: COMMERCIAL

## 2020-08-07 ENCOUNTER — APPOINTMENT (OUTPATIENT)
Dept: ULTRASOUND IMAGING | Facility: CLINIC | Age: 25
End: 2020-08-07
Attending: EMERGENCY MEDICINE
Payer: COMMERCIAL

## 2020-08-07 DIAGNOSIS — K35.30 ACUTE APPENDICITIS WITH LOCALIZED PERITONITIS, WITHOUT PERFORATION, ABSCESS, OR GANGRENE: ICD-10-CM

## 2020-08-07 LAB
BASOPHILS # BLD AUTO: 0 10E9/L (ref 0–0.2)
BASOPHILS NFR BLD AUTO: 0.3 %
DIFFERENTIAL METHOD BLD: ABNORMAL
EOSINOPHIL # BLD AUTO: 0.2 10E9/L (ref 0–0.7)
EOSINOPHIL NFR BLD AUTO: 1.3 %
ERYTHROCYTE [DISTWIDTH] IN BLOOD BY AUTOMATED COUNT: 12.7 % (ref 10–15)
HCG SERPL QL: NEGATIVE
HCT VFR BLD AUTO: 40 % (ref 35–47)
HGB BLD-MCNC: 13.3 G/DL (ref 11.7–15.7)
IMM GRANULOCYTES # BLD: 0 10E9/L (ref 0–0.4)
IMM GRANULOCYTES NFR BLD: 0.1 %
LYMPHOCYTES # BLD AUTO: 1.8 10E9/L (ref 0.8–5.3)
LYMPHOCYTES NFR BLD AUTO: 13 %
MCH RBC QN AUTO: 30.8 PG (ref 26.5–33)
MCHC RBC AUTO-ENTMCNC: 33.3 G/DL (ref 31.5–36.5)
MCV RBC AUTO: 93 FL (ref 78–100)
MONOCYTES # BLD AUTO: 0.8 10E9/L (ref 0–1.3)
MONOCYTES NFR BLD AUTO: 5.6 %
NEUTROPHILS # BLD AUTO: 10.7 10E9/L (ref 1.6–8.3)
NEUTROPHILS NFR BLD AUTO: 79.7 %
NRBC # BLD AUTO: 0 10*3/UL
NRBC BLD AUTO-RTO: 0 /100
PLATELET # BLD AUTO: 343 10E9/L (ref 150–450)
RBC # BLD AUTO: 4.32 10E12/L (ref 3.8–5.2)
WBC # BLD AUTO: 13.4 10E9/L (ref 4–11)

## 2020-08-07 PROCEDURE — C9803 HOPD COVID-19 SPEC COLLECT: HCPCS

## 2020-08-07 PROCEDURE — 84703 CHORIONIC GONADOTROPIN ASSAY: CPT | Performed by: EMERGENCY MEDICINE

## 2020-08-07 PROCEDURE — 25800030 ZZH RX IP 258 OP 636: Performed by: EMERGENCY MEDICINE

## 2020-08-07 PROCEDURE — 76830 TRANSVAGINAL US NON-OB: CPT

## 2020-08-07 PROCEDURE — 80053 COMPREHEN METABOLIC PANEL: CPT | Performed by: EMERGENCY MEDICINE

## 2020-08-07 PROCEDURE — 25000128 H RX IP 250 OP 636: Performed by: EMERGENCY MEDICINE

## 2020-08-07 PROCEDURE — 83690 ASSAY OF LIPASE: CPT | Performed by: EMERGENCY MEDICINE

## 2020-08-07 PROCEDURE — 96375 TX/PRO/DX INJ NEW DRUG ADDON: CPT

## 2020-08-07 PROCEDURE — 99285 EMERGENCY DEPT VISIT HI MDM: CPT | Mod: 25

## 2020-08-07 PROCEDURE — 96361 HYDRATE IV INFUSION ADD-ON: CPT

## 2020-08-07 PROCEDURE — 85025 COMPLETE CBC W/AUTO DIFF WBC: CPT | Performed by: EMERGENCY MEDICINE

## 2020-08-07 RX ORDER — HYDROMORPHONE HYDROCHLORIDE 1 MG/ML
0.5 INJECTION, SOLUTION INTRAMUSCULAR; INTRAVENOUS; SUBCUTANEOUS
Status: DISCONTINUED | OUTPATIENT
Start: 2020-08-07 | End: 2020-08-08

## 2020-08-07 RX ORDER — ONDANSETRON 2 MG/ML
4 INJECTION INTRAMUSCULAR; INTRAVENOUS EVERY 30 MIN PRN
Status: DISCONTINUED | OUTPATIENT
Start: 2020-08-07 | End: 2020-08-08

## 2020-08-07 RX ORDER — SODIUM CHLORIDE 9 MG/ML
INJECTION, SOLUTION INTRAVENOUS CONTINUOUS
Status: DISCONTINUED | OUTPATIENT
Start: 2020-08-08 | End: 2020-08-08

## 2020-08-07 RX ORDER — KETOROLAC TROMETHAMINE 15 MG/ML
15 INJECTION, SOLUTION INTRAMUSCULAR; INTRAVENOUS ONCE
Status: COMPLETED | OUTPATIENT
Start: 2020-08-07 | End: 2020-08-07

## 2020-08-07 RX ADMIN — HYDROMORPHONE HYDROCHLORIDE 0.5 MG: 1 INJECTION, SOLUTION INTRAMUSCULAR; INTRAVENOUS; SUBCUTANEOUS at 23:33

## 2020-08-07 RX ADMIN — ONDANSETRON 4 MG: 2 INJECTION INTRAMUSCULAR; INTRAVENOUS at 23:34

## 2020-08-07 RX ADMIN — KETOROLAC TROMETHAMINE 15 MG: 15 INJECTION, SOLUTION INTRAMUSCULAR; INTRAVENOUS at 23:34

## 2020-08-07 RX ADMIN — SODIUM CHLORIDE 1000 ML: 9 INJECTION, SOLUTION INTRAVENOUS at 23:38

## 2020-08-07 ASSESSMENT — ENCOUNTER SYMPTOMS
ABDOMINAL PAIN: 1
VOMITING: 1
NUMBNESS: 1
DYSURIA: 0
CONSTIPATION: 1

## 2020-08-08 ENCOUNTER — ANESTHESIA (OUTPATIENT)
Dept: SURGERY | Facility: CLINIC | Age: 25
End: 2020-08-08
Payer: COMMERCIAL

## 2020-08-08 ENCOUNTER — ANESTHESIA EVENT (OUTPATIENT)
Dept: SURGERY | Facility: CLINIC | Age: 25
End: 2020-08-08
Payer: COMMERCIAL

## 2020-08-08 ENCOUNTER — SURGERY (OUTPATIENT)
Age: 25
End: 2020-08-08
Payer: COMMERCIAL

## 2020-08-08 ENCOUNTER — APPOINTMENT (OUTPATIENT)
Dept: CT IMAGING | Facility: CLINIC | Age: 25
End: 2020-08-08
Attending: EMERGENCY MEDICINE
Payer: COMMERCIAL

## 2020-08-08 PROBLEM — K37 APPENDICITIS: Status: ACTIVE | Noted: 2020-08-08

## 2020-08-08 LAB
ALBUMIN SERPL-MCNC: 4.1 G/DL (ref 3.4–5)
ALBUMIN UR-MCNC: NEGATIVE MG/DL
ALP SERPL-CCNC: 65 U/L (ref 40–150)
ALT SERPL W P-5'-P-CCNC: 13 U/L (ref 0–50)
AMORPH CRY #/AREA URNS HPF: ABNORMAL /HPF
ANION GAP SERPL CALCULATED.3IONS-SCNC: 6 MMOL/L (ref 3–14)
APPEARANCE UR: ABNORMAL
AST SERPL W P-5'-P-CCNC: 22 U/L (ref 0–45)
BILIRUB SERPL-MCNC: 0.5 MG/DL (ref 0.2–1.3)
BILIRUB UR QL STRIP: NEGATIVE
BUN SERPL-MCNC: 8 MG/DL (ref 7–30)
CALCIUM SERPL-MCNC: 9.2 MG/DL (ref 8.5–10.1)
CHLORIDE SERPL-SCNC: 107 MMOL/L (ref 94–109)
CO2 SERPL-SCNC: 23 MMOL/L (ref 20–32)
COLOR UR AUTO: YELLOW
CREAT SERPL-MCNC: 0.73 MG/DL (ref 0.52–1.04)
GFR SERPL CREATININE-BSD FRML MDRD: >90 ML/MIN/{1.73_M2}
GLUCOSE SERPL-MCNC: 103 MG/DL (ref 70–99)
GLUCOSE UR STRIP-MCNC: NEGATIVE MG/DL
HGB UR QL STRIP: ABNORMAL
KETONES UR STRIP-MCNC: 5 MG/DL
LEUKOCYTE ESTERASE UR QL STRIP: NEGATIVE
LIPASE SERPL-CCNC: 65 U/L (ref 73–393)
MUCOUS THREADS #/AREA URNS LPF: PRESENT /LPF
NITRATE UR QL: NEGATIVE
PH UR STRIP: 8 PH (ref 5–7)
POTASSIUM SERPL-SCNC: 4.2 MMOL/L (ref 3.4–5.3)
PROT SERPL-MCNC: 7.6 G/DL (ref 6.8–8.8)
RBC #/AREA URNS AUTO: 0 /HPF (ref 0–2)
SARS-COV-2 RNA SPEC QL NAA+PROBE: NOT DETECTED
SODIUM SERPL-SCNC: 136 MMOL/L (ref 133–144)
SOURCE: ABNORMAL
SP GR UR STRIP: 1.02 (ref 1–1.03)
SPECIMEN SOURCE: NORMAL
SQUAMOUS #/AREA URNS AUTO: 1 /HPF (ref 0–1)
UROBILINOGEN UR STRIP-MCNC: NORMAL MG/DL (ref 0–2)
WBC #/AREA URNS AUTO: 1 /HPF (ref 0–5)

## 2020-08-08 PROCEDURE — 71000013 ZZH RECOVERY PHASE 1 LEVEL 1 EA ADDTL HR: Performed by: SURGERY

## 2020-08-08 PROCEDURE — G0378 HOSPITAL OBSERVATION PER HR: HCPCS

## 2020-08-08 PROCEDURE — 74177 CT ABD & PELVIS W/CONTRAST: CPT

## 2020-08-08 PROCEDURE — 25000128 H RX IP 250 OP 636: Performed by: SURGERY

## 2020-08-08 PROCEDURE — 96376 TX/PRO/DX INJ SAME DRUG ADON: CPT | Mod: 59

## 2020-08-08 PROCEDURE — 25000128 H RX IP 250 OP 636: Performed by: ANESTHESIOLOGY

## 2020-08-08 PROCEDURE — 25000132 ZZH RX MED GY IP 250 OP 250 PS 637: Performed by: SURGERY

## 2020-08-08 PROCEDURE — 25000128 H RX IP 250 OP 636: Performed by: EMERGENCY MEDICINE

## 2020-08-08 PROCEDURE — 25000125 ZZHC RX 250: Performed by: SURGERY

## 2020-08-08 PROCEDURE — 40000169 ZZH STATISTIC PRE-PROCEDURE ASSESSMENT I: Performed by: SURGERY

## 2020-08-08 PROCEDURE — 88304 TISSUE EXAM BY PATHOLOGIST: CPT | Performed by: SURGERY

## 2020-08-08 PROCEDURE — 96375 TX/PRO/DX INJ NEW DRUG ADDON: CPT

## 2020-08-08 PROCEDURE — 44970 LAPAROSCOPY APPENDECTOMY: CPT | Performed by: SURGERY

## 2020-08-08 PROCEDURE — U0003 INFECTIOUS AGENT DETECTION BY NUCLEIC ACID (DNA OR RNA); SEVERE ACUTE RESPIRATORY SYNDROME CORONAVIRUS 2 (SARS-COV-2) (CORONAVIRUS DISEASE [COVID-19]), AMPLIFIED PROBE TECHNIQUE, MAKING USE OF HIGH THROUGHPUT TECHNOLOGIES AS DESCRIBED BY CMS-2020-01-R: HCPCS | Performed by: EMERGENCY MEDICINE

## 2020-08-08 PROCEDURE — 96365 THER/PROPH/DIAG IV INF INIT: CPT | Mod: 59

## 2020-08-08 PROCEDURE — 25800030 ZZH RX IP 258 OP 636: Performed by: SURGERY

## 2020-08-08 PROCEDURE — 36000056 ZZH SURGERY LEVEL 3 1ST 30 MIN: Performed by: SURGERY

## 2020-08-08 PROCEDURE — 36000058 ZZH SURGERY LEVEL 3 EA 15 ADDTL MIN: Performed by: SURGERY

## 2020-08-08 PROCEDURE — 88304 TISSUE EXAM BY PATHOLOGIST: CPT | Mod: 26 | Performed by: SURGERY

## 2020-08-08 PROCEDURE — 37000009 ZZH ANESTHESIA TECHNICAL FEE, EACH ADDTL 15 MIN: Performed by: SURGERY

## 2020-08-08 PROCEDURE — 25000125 ZZHC RX 250: Performed by: EMERGENCY MEDICINE

## 2020-08-08 PROCEDURE — 37000008 ZZH ANESTHESIA TECHNICAL FEE, 1ST 30 MIN: Performed by: SURGERY

## 2020-08-08 PROCEDURE — 25800030 ZZH RX IP 258 OP 636: Performed by: NURSE ANESTHETIST, CERTIFIED REGISTERED

## 2020-08-08 PROCEDURE — 99204 OFFICE O/P NEW MOD 45 MIN: CPT | Mod: 57 | Performed by: SURGERY

## 2020-08-08 PROCEDURE — 27210794 ZZH OR GENERAL SUPPLY STERILE: Performed by: SURGERY

## 2020-08-08 PROCEDURE — 71000012 ZZH RECOVERY PHASE 1 LEVEL 1 FIRST HR: Performed by: SURGERY

## 2020-08-08 PROCEDURE — 96361 HYDRATE IV INFUSION ADD-ON: CPT

## 2020-08-08 PROCEDURE — 25800025 ZZH RX 258: Performed by: SURGERY

## 2020-08-08 PROCEDURE — 25000125 ZZHC RX 250: Performed by: NURSE ANESTHETIST, CERTIFIED REGISTERED

## 2020-08-08 PROCEDURE — 25000128 H RX IP 250 OP 636: Performed by: NURSE ANESTHETIST, CERTIFIED REGISTERED

## 2020-08-08 PROCEDURE — 81001 URINALYSIS AUTO W/SCOPE: CPT | Performed by: EMERGENCY MEDICINE

## 2020-08-08 RX ORDER — AMOXICILLIN 250 MG
1 CAPSULE ORAL 2 TIMES DAILY
Status: DISCONTINUED | OUTPATIENT
Start: 2020-08-08 | End: 2020-08-09 | Stop reason: HOSPADM

## 2020-08-08 RX ORDER — ONDANSETRON 2 MG/ML
4 INJECTION INTRAMUSCULAR; INTRAVENOUS EVERY 30 MIN PRN
Status: DISCONTINUED | OUTPATIENT
Start: 2020-08-08 | End: 2020-08-08

## 2020-08-08 RX ORDER — HYDROCODONE BITARTRATE AND ACETAMINOPHEN 5; 325 MG/1; MG/1
2 TABLET ORAL
Status: DISCONTINUED | OUTPATIENT
Start: 2020-08-08 | End: 2020-08-08

## 2020-08-08 RX ORDER — NALOXONE HYDROCHLORIDE 0.4 MG/ML
.1-.4 INJECTION, SOLUTION INTRAMUSCULAR; INTRAVENOUS; SUBCUTANEOUS
Status: DISCONTINUED | OUTPATIENT
Start: 2020-08-08 | End: 2020-08-09 | Stop reason: HOSPADM

## 2020-08-08 RX ORDER — ONDANSETRON 4 MG/1
4 TABLET, ORALLY DISINTEGRATING ORAL EVERY 6 HOURS PRN
Status: DISCONTINUED | OUTPATIENT
Start: 2020-08-08 | End: 2020-08-09 | Stop reason: HOSPADM

## 2020-08-08 RX ORDER — SODIUM CHLORIDE, SODIUM LACTATE, POTASSIUM CHLORIDE, CALCIUM CHLORIDE 600; 310; 30; 20 MG/100ML; MG/100ML; MG/100ML; MG/100ML
INJECTION, SOLUTION INTRAVENOUS CONTINUOUS
Status: DISCONTINUED | OUTPATIENT
Start: 2020-08-08 | End: 2020-08-08

## 2020-08-08 RX ORDER — AMOXICILLIN 250 MG
2 CAPSULE ORAL 2 TIMES DAILY
Status: DISCONTINUED | OUTPATIENT
Start: 2020-08-08 | End: 2020-08-09 | Stop reason: HOSPADM

## 2020-08-08 RX ORDER — PROCHLORPERAZINE MALEATE 10 MG
10 TABLET ORAL EVERY 6 HOURS PRN
Status: DISCONTINUED | OUTPATIENT
Start: 2020-08-08 | End: 2020-08-09 | Stop reason: HOSPADM

## 2020-08-08 RX ORDER — PROCHLORPERAZINE 25 MG
25 SUPPOSITORY, RECTAL RECTAL EVERY 12 HOURS PRN
Status: DISCONTINUED | OUTPATIENT
Start: 2020-08-08 | End: 2020-08-09 | Stop reason: HOSPADM

## 2020-08-08 RX ORDER — NALOXONE HYDROCHLORIDE 0.4 MG/ML
.1-.4 INJECTION, SOLUTION INTRAMUSCULAR; INTRAVENOUS; SUBCUTANEOUS
Status: DISCONTINUED | OUTPATIENT
Start: 2020-08-08 | End: 2020-08-08

## 2020-08-08 RX ORDER — IOPAMIDOL 755 MG/ML
78 INJECTION, SOLUTION INTRAVASCULAR ONCE
Status: COMPLETED | OUTPATIENT
Start: 2020-08-08 | End: 2020-08-08

## 2020-08-08 RX ORDER — MAGNESIUM HYDROXIDE 1200 MG/15ML
LIQUID ORAL PRN
Status: DISCONTINUED | OUTPATIENT
Start: 2020-08-08 | End: 2020-08-08

## 2020-08-08 RX ORDER — SODIUM CHLORIDE, SODIUM LACTATE, POTASSIUM CHLORIDE, CALCIUM CHLORIDE 600; 310; 30; 20 MG/100ML; MG/100ML; MG/100ML; MG/100ML
INJECTION, SOLUTION INTRAVENOUS CONTINUOUS PRN
Status: DISCONTINUED | OUTPATIENT
Start: 2020-08-08 | End: 2020-08-08

## 2020-08-08 RX ORDER — HYDROMORPHONE HYDROCHLORIDE 1 MG/ML
0.5 INJECTION, SOLUTION INTRAMUSCULAR; INTRAVENOUS; SUBCUTANEOUS
Status: DISCONTINUED | OUTPATIENT
Start: 2020-08-08 | End: 2020-08-09

## 2020-08-08 RX ORDER — ONDANSETRON 2 MG/ML
4 INJECTION INTRAMUSCULAR; INTRAVENOUS EVERY 6 HOURS PRN
Status: DISCONTINUED | OUTPATIENT
Start: 2020-08-08 | End: 2020-08-09 | Stop reason: HOSPADM

## 2020-08-08 RX ORDER — OXYCODONE HYDROCHLORIDE 5 MG/1
5-10 TABLET ORAL
Qty: 12 TABLET | Refills: 0 | Status: SHIPPED | OUTPATIENT
Start: 2020-08-08

## 2020-08-08 RX ORDER — DEXAMETHASONE SODIUM PHOSPHATE 4 MG/ML
INJECTION, SOLUTION INTRA-ARTICULAR; INTRALESIONAL; INTRAMUSCULAR; INTRAVENOUS; SOFT TISSUE PRN
Status: DISCONTINUED | OUTPATIENT
Start: 2020-08-08 | End: 2020-08-08

## 2020-08-08 RX ORDER — LIDOCAINE HYDROCHLORIDE 20 MG/ML
INJECTION, SOLUTION INFILTRATION; PERINEURAL PRN
Status: DISCONTINUED | OUTPATIENT
Start: 2020-08-08 | End: 2020-08-08

## 2020-08-08 RX ORDER — GLYCOPYRROLATE 0.2 MG/ML
INJECTION, SOLUTION INTRAMUSCULAR; INTRAVENOUS PRN
Status: DISCONTINUED | OUTPATIENT
Start: 2020-08-08 | End: 2020-08-08

## 2020-08-08 RX ORDER — METOCLOPRAMIDE HYDROCHLORIDE 5 MG/ML
5 INJECTION INTRAMUSCULAR; INTRAVENOUS EVERY 6 HOURS PRN
Status: DISCONTINUED | OUTPATIENT
Start: 2020-08-08 | End: 2020-08-09 | Stop reason: HOSPADM

## 2020-08-08 RX ORDER — ONDANSETRON 2 MG/ML
INJECTION INTRAMUSCULAR; INTRAVENOUS PRN
Status: DISCONTINUED | OUTPATIENT
Start: 2020-08-08 | End: 2020-08-08

## 2020-08-08 RX ORDER — ACETAMINOPHEN 325 MG/1
650 TABLET ORAL EVERY 4 HOURS PRN
Status: DISCONTINUED | OUTPATIENT
Start: 2020-08-08 | End: 2020-08-09 | Stop reason: HOSPADM

## 2020-08-08 RX ORDER — ACETAMINOPHEN 650 MG/1
650 SUPPOSITORY RECTAL EVERY 4 HOURS PRN
Status: DISCONTINUED | OUTPATIENT
Start: 2020-08-08 | End: 2020-08-09 | Stop reason: HOSPADM

## 2020-08-08 RX ORDER — OXYCODONE HYDROCHLORIDE 5 MG/1
5 TABLET ORAL EVERY 4 HOURS PRN
Status: DISCONTINUED | OUTPATIENT
Start: 2020-08-08 | End: 2020-08-09

## 2020-08-08 RX ORDER — PROPOFOL 10 MG/ML
INJECTION, EMULSION INTRAVENOUS CONTINUOUS PRN
Status: DISCONTINUED | OUTPATIENT
Start: 2020-08-08 | End: 2020-08-08

## 2020-08-08 RX ORDER — FENTANYL CITRATE 50 UG/ML
25-50 INJECTION, SOLUTION INTRAMUSCULAR; INTRAVENOUS EVERY 5 MIN PRN
Status: DISCONTINUED | OUTPATIENT
Start: 2020-08-08 | End: 2020-08-08

## 2020-08-08 RX ORDER — HYDROCODONE BITARTRATE AND ACETAMINOPHEN 5; 325 MG/1; MG/1
1-2 TABLET ORAL EVERY 4 HOURS PRN
Status: DISCONTINUED | OUTPATIENT
Start: 2020-08-08 | End: 2020-08-08

## 2020-08-08 RX ORDER — ONDANSETRON 4 MG/1
4 TABLET, ORALLY DISINTEGRATING ORAL EVERY 30 MIN PRN
Status: DISCONTINUED | OUTPATIENT
Start: 2020-08-08 | End: 2020-08-08

## 2020-08-08 RX ORDER — ALBUTEROL SULFATE 0.83 MG/ML
2.5 SOLUTION RESPIRATORY (INHALATION) EVERY 4 HOURS PRN
Status: DISCONTINUED | OUTPATIENT
Start: 2020-08-08 | End: 2020-08-08

## 2020-08-08 RX ORDER — NEOSTIGMINE METHYLSULFATE 1 MG/ML
VIAL (ML) INJECTION PRN
Status: DISCONTINUED | OUTPATIENT
Start: 2020-08-08 | End: 2020-08-08

## 2020-08-08 RX ORDER — KETOROLAC TROMETHAMINE 30 MG/ML
30 INJECTION, SOLUTION INTRAMUSCULAR; INTRAVENOUS EVERY 6 HOURS PRN
Status: COMPLETED | OUTPATIENT
Start: 2020-08-08 | End: 2020-08-08

## 2020-08-08 RX ORDER — ARIPIPRAZOLE 2 MG/1
2 TABLET ORAL DAILY
Status: DISCONTINUED | OUTPATIENT
Start: 2020-08-08 | End: 2020-08-08

## 2020-08-08 RX ORDER — SODIUM CHLORIDE 9 MG/ML
INJECTION, SOLUTION INTRAVENOUS CONTINUOUS
Status: DISCONTINUED | OUTPATIENT
Start: 2020-08-08 | End: 2020-08-09

## 2020-08-08 RX ORDER — FLUOXETINE 10 MG/1
10 CAPSULE ORAL DAILY
Status: DISCONTINUED | OUTPATIENT
Start: 2020-08-08 | End: 2020-08-08

## 2020-08-08 RX ORDER — MEPERIDINE HYDROCHLORIDE 25 MG/ML
12.5 INJECTION INTRAMUSCULAR; INTRAVENOUS; SUBCUTANEOUS EVERY 5 MIN PRN
Status: DISCONTINUED | OUTPATIENT
Start: 2020-08-08 | End: 2020-08-08

## 2020-08-08 RX ORDER — PROPOFOL 10 MG/ML
INJECTION, EMULSION INTRAVENOUS PRN
Status: DISCONTINUED | OUTPATIENT
Start: 2020-08-08 | End: 2020-08-08

## 2020-08-08 RX ORDER — MEPERIDINE HYDROCHLORIDE 25 MG/ML
25 INJECTION INTRAMUSCULAR; INTRAVENOUS; SUBCUTANEOUS ONCE
Status: COMPLETED | OUTPATIENT
Start: 2020-08-08 | End: 2020-08-08

## 2020-08-08 RX ORDER — FENTANYL CITRATE 50 UG/ML
INJECTION, SOLUTION INTRAMUSCULAR; INTRAVENOUS PRN
Status: DISCONTINUED | OUTPATIENT
Start: 2020-08-08 | End: 2020-08-08

## 2020-08-08 RX ORDER — SODIUM CHLORIDE 9 MG/ML
INJECTION, SOLUTION INTRAVENOUS CONTINUOUS
Status: DISCONTINUED | OUTPATIENT
Start: 2020-08-08 | End: 2020-08-08

## 2020-08-08 RX ORDER — SENNA AND DOCUSATE SODIUM 50; 8.6 MG/1; MG/1
1 TABLET, FILM COATED ORAL 2 TIMES DAILY
Qty: 60 TABLET | Refills: 0 | Status: SHIPPED | OUTPATIENT
Start: 2020-08-08

## 2020-08-08 RX ORDER — HYDROMORPHONE HYDROCHLORIDE 1 MG/ML
.3-.5 INJECTION, SOLUTION INTRAMUSCULAR; INTRAVENOUS; SUBCUTANEOUS EVERY 5 MIN PRN
Status: DISCONTINUED | OUTPATIENT
Start: 2020-08-08 | End: 2020-08-08

## 2020-08-08 RX ORDER — ERTAPENEM 1 G/1
1 INJECTION, POWDER, LYOPHILIZED, FOR SOLUTION INTRAMUSCULAR; INTRAVENOUS ONCE
Status: COMPLETED | OUTPATIENT
Start: 2020-08-08 | End: 2020-08-08

## 2020-08-08 RX ADMIN — MEPERIDINE HYDROCHLORIDE 25 MG: 25 INJECTION INTRAMUSCULAR; INTRAVENOUS; SUBCUTANEOUS at 14:29

## 2020-08-08 RX ADMIN — DEXAMETHASONE SODIUM PHOSPHATE 4 MG: 4 INJECTION, SOLUTION INTRA-ARTICULAR; INTRALESIONAL; INTRAMUSCULAR; INTRAVENOUS; SOFT TISSUE at 12:26

## 2020-08-08 RX ADMIN — GLYCOPYRROLATE 0.8 MG: 0.2 INJECTION, SOLUTION INTRAMUSCULAR; INTRAVENOUS at 13:22

## 2020-08-08 RX ADMIN — SODIUM CHLORIDE 62 ML: 9 INJECTION, SOLUTION INTRAVENOUS at 01:17

## 2020-08-08 RX ADMIN — ERTAPENEM SODIUM 1 G: 1 INJECTION, POWDER, LYOPHILIZED, FOR SOLUTION INTRAMUSCULAR; INTRAVENOUS at 02:06

## 2020-08-08 RX ADMIN — KETOROLAC TROMETHAMINE 30 MG: 30 INJECTION, SOLUTION INTRAMUSCULAR at 14:21

## 2020-08-08 RX ADMIN — SODIUM CHLORIDE 1000 ML: 900 IRRIGANT IRRIGATION at 13:01

## 2020-08-08 RX ADMIN — HYDROMORPHONE HYDROCHLORIDE 0.5 MG: 1 INJECTION, SOLUTION INTRAMUSCULAR; INTRAVENOUS; SUBCUTANEOUS at 13:41

## 2020-08-08 RX ADMIN — DOCUSATE SODIUM AND SENNOSIDES 2 TABLET: 8.6; 5 TABLET, FILM COATED ORAL at 08:40

## 2020-08-08 RX ADMIN — MIDAZOLAM HYDROCHLORIDE 1 MG: 1 INJECTION, SOLUTION INTRAMUSCULAR; INTRAVENOUS at 13:48

## 2020-08-08 RX ADMIN — DEXMEDETOMIDINE HYDROCHLORIDE 8 MCG: 100 INJECTION, SOLUTION INTRAVENOUS at 13:43

## 2020-08-08 RX ADMIN — MIDAZOLAM HYDROCHLORIDE 2 MG: 1 INJECTION, SOLUTION INTRAMUSCULAR; INTRAVENOUS at 12:16

## 2020-08-08 RX ADMIN — HYDROMORPHONE HYDROCHLORIDE 0.5 MG: 1 INJECTION, SOLUTION INTRAMUSCULAR; INTRAVENOUS; SUBCUTANEOUS at 10:11

## 2020-08-08 RX ADMIN — SODIUM CHLORIDE, POTASSIUM CHLORIDE, SODIUM LACTATE AND CALCIUM CHLORIDE: 600; 310; 30; 20 INJECTION, SOLUTION INTRAVENOUS at 12:16

## 2020-08-08 RX ADMIN — LIDOCAINE HYDROCHLORIDE 100 MG: 20 INJECTION, SOLUTION INFILTRATION; PERINEURAL at 12:26

## 2020-08-08 RX ADMIN — ARIPIPRAZOLE 2 MG: 2 TABLET ORAL at 08:40

## 2020-08-08 RX ADMIN — PROCHLORPERAZINE EDISYLATE 10 MG: 5 INJECTION INTRAMUSCULAR; INTRAVENOUS at 16:24

## 2020-08-08 RX ADMIN — FENTANYL CITRATE 50 MCG: 50 INJECTION, SOLUTION INTRAMUSCULAR; INTRAVENOUS at 12:26

## 2020-08-08 RX ADMIN — IOPAMIDOL 78 ML: 755 INJECTION, SOLUTION INTRAVENOUS at 01:15

## 2020-08-08 RX ADMIN — METOCLOPRAMIDE 5 MG: 5 INJECTION, SOLUTION INTRAMUSCULAR; INTRAVENOUS at 20:59

## 2020-08-08 RX ADMIN — HYDROMORPHONE HYDROCHLORIDE 0.5 MG: 1 INJECTION, SOLUTION INTRAMUSCULAR; INTRAVENOUS; SUBCUTANEOUS at 21:15

## 2020-08-08 RX ADMIN — PROPOFOL 200 MCG/KG/MIN: 10 INJECTION, EMULSION INTRAVENOUS at 12:27

## 2020-08-08 RX ADMIN — ONDANSETRON 4 MG: 2 INJECTION INTRAMUSCULAR; INTRAVENOUS at 15:55

## 2020-08-08 RX ADMIN — ONDANSETRON 4 MG: 2 INJECTION INTRAMUSCULAR; INTRAVENOUS at 11:03

## 2020-08-08 RX ADMIN — SUGAMMADEX 200 MG: 100 INJECTION, SOLUTION INTRAVENOUS at 13:36

## 2020-08-08 RX ADMIN — SODIUM CHLORIDE: 9 INJECTION, SOLUTION INTRAVENOUS at 08:39

## 2020-08-08 RX ADMIN — PROPOFOL 200 MG: 10 INJECTION, EMULSION INTRAVENOUS at 12:26

## 2020-08-08 RX ADMIN — FENTANYL CITRATE 50 MCG: 50 INJECTION, SOLUTION INTRAMUSCULAR; INTRAVENOUS at 12:16

## 2020-08-08 RX ADMIN — OXYCODONE HYDROCHLORIDE 5 MG: 5 TABLET ORAL at 16:05

## 2020-08-08 RX ADMIN — MIDAZOLAM HYDROCHLORIDE 1 MG: 1 INJECTION, SOLUTION INTRAMUSCULAR; INTRAVENOUS at 13:46

## 2020-08-08 RX ADMIN — ONDANSETRON 4 MG: 2 INJECTION INTRAMUSCULAR; INTRAVENOUS at 13:09

## 2020-08-08 RX ADMIN — HYDROMORPHONE HYDROCHLORIDE 0.5 MG: 1 INJECTION, SOLUTION INTRAMUSCULAR; INTRAVENOUS; SUBCUTANEOUS at 13:38

## 2020-08-08 RX ADMIN — SODIUM CHLORIDE: 9 INJECTION, SOLUTION INTRAVENOUS at 18:46

## 2020-08-08 RX ADMIN — SODIUM CHLORIDE: 9 INJECTION, SOLUTION INTRAVENOUS at 23:54

## 2020-08-08 RX ADMIN — ROCURONIUM BROMIDE 10 MG: 10 INJECTION INTRAVENOUS at 12:57

## 2020-08-08 RX ADMIN — FLUOXETINE 10 MG: 10 CAPSULE ORAL at 08:40

## 2020-08-08 RX ADMIN — SODIUM CHLORIDE: 9 INJECTION, SOLUTION INTRAVENOUS at 03:31

## 2020-08-08 RX ADMIN — HYDROMORPHONE HYDROCHLORIDE 0.5 MG: 1 INJECTION, SOLUTION INTRAMUSCULAR; INTRAVENOUS; SUBCUTANEOUS at 07:18

## 2020-08-08 RX ADMIN — HYDROMORPHONE HYDROCHLORIDE 0.5 MG: 1 INJECTION, SOLUTION INTRAMUSCULAR; INTRAVENOUS; SUBCUTANEOUS at 18:48

## 2020-08-08 RX ADMIN — DEXMEDETOMIDINE HYDROCHLORIDE 12 MCG: 100 INJECTION, SOLUTION INTRAVENOUS at 13:39

## 2020-08-08 RX ADMIN — NEOSTIGMINE METHYLSULFATE 4 MG: 1 INJECTION, SOLUTION INTRAVENOUS at 13:22

## 2020-08-08 RX ADMIN — HYDROMORPHONE HYDROCHLORIDE 0.5 MG: 1 INJECTION, SOLUTION INTRAMUSCULAR; INTRAVENOUS; SUBCUTANEOUS at 14:17

## 2020-08-08 RX ADMIN — ROCURONIUM BROMIDE 40 MG: 10 INJECTION INTRAVENOUS at 12:26

## 2020-08-08 RX ADMIN — SODIUM CHLORIDE, POTASSIUM CHLORIDE, SODIUM LACTATE AND CALCIUM CHLORIDE: 600; 310; 30; 20 INJECTION, SOLUTION INTRAVENOUS at 13:22

## 2020-08-08 RX ADMIN — HYDROMORPHONE HYDROCHLORIDE 0.5 MG: 1 INJECTION, SOLUTION INTRAMUSCULAR; INTRAVENOUS; SUBCUTANEOUS at 13:45

## 2020-08-08 ASSESSMENT — LIFESTYLE VARIABLES: TOBACCO_USE: 1

## 2020-08-08 ASSESSMENT — MIFFLIN-ST. JEOR: SCORE: 1368.68

## 2020-08-08 NOTE — ED TRIAGE NOTES
RLQ abd pain since about 1400 today.  Worried about ruptured cyst.  Also took Plan B last week with forced period ending today

## 2020-08-08 NOTE — PROVIDER NOTIFICATION
MD Notification    Notified Person: MD    Notified Person Name: Dr. LORENE De Los Santos     Notification Date/Time: 8/8/2020, 17:45    Notification Interaction: Phone call     Purpose of Notification: Discharge order in but pt still requiring IV antiemetics & refusing PO intake. Please advise. Thanks     Orders Received: Discontinue discharge orders & keep patient overnight.     Comments:

## 2020-08-08 NOTE — ANESTHESIA CARE TRANSFER NOTE
Patient: Rachel Danielle    Procedure(s):  APPENDECTOMY, LAPAROSCOPIC    Diagnosis: Appendicitis [K37]  Diagnosis Additional Information: No value filed.    Anesthesia Type:   General     Note:  Airway :Nasal Cannula  Patient transferred to:PACU  Handoff Report: Identifed the Patient, Identified the Reponsible Provider, Reviewed the pertinent medical history, Discussed the surgical course, Reviewed Intra-OP anesthesia mangement and issues during anesthesia, Set expectations for post-procedure period and Allowed opportunity for questions and acknowledgement of understanding      Vitals: (Last set prior to Anesthesia Care Transfer)    CRNA VITALS  8/8/2020 1319 - 8/8/2020 1356      8/8/2020             NIBP:  142/90    NIBP Mean:  105    Resp Rate (set):  10                Electronically Signed By: ERIKA Al CRNA  August 8, 2020  1:56 PM

## 2020-08-08 NOTE — PROGRESS NOTES
RECEIVING UNIT ED HANDOFF REVIEW    ED Nurse Handoff Report was reviewed by: Kate Spear RN on August 8, 2020 at 2:49 AM

## 2020-08-08 NOTE — ANESTHESIA POSTPROCEDURE EVALUATION
Patient: Rachel Danielle    Procedure(s):  APPENDECTOMY, LAPAROSCOPIC    Diagnosis:Appendicitis [K37]  Diagnosis Additional Information: No value filed.    Anesthesia Type:  General    Note:  Anesthesia Post Evaluation    Patient location during evaluation: Bedside  Patient participation: Able to fully participate in evaluation  Level of consciousness: awake and alert  Pain management: adequate  Airway patency: patent  Cardiovascular status: acceptable  Respiratory status: acceptable  Hydration status: acceptable  PONV: none             Last vitals:  Vitals:    08/08/20 1430 08/08/20 1440 08/08/20 1450   BP: (!) 131/93 131/87 (!) 127/93   Pulse: 75 61 75   Resp: 16 12 9   Temp: 36.4  C (97.5  F) 36.5  C (97.7  F) 36.4  C (97.5  F)   SpO2: 99% 99% 99%         Electronically Signed By: Moody Gregory MD  August 8, 2020  3:14 PM

## 2020-08-08 NOTE — DISCHARGE INSTRUCTIONS
You were given the medication Sugammadex that may decrease effectiveness of birth control.  We recommend you use a backup method of birth control for 7 days after surgery.  Continue to take your hormonal contraceptive during this period.    Same Day Surgery Discharge Instructions for  Sedation and General Anesthesia       It's not unusual to feel dizzy, light-headed or faint for up to 24 hours after surgery or while taking pain medication.  If you have these symptoms: sit for a few minutes before standing and have someone assist you when you get up to walk or use the bathroom.      You should rest and relax for the next 24 hours. We recommend you make arrangements to have an adult stay with you for at least 24 hours after your discharge.  Avoid hazardous and strenuous activity.      DO NOT DRIVE any vehicle or operate mechanical equipment for 24 hours following the end of your surgery.  Even though you may feel normal, your reactions may be affected by the medication you have received.      Do not drink alcoholic beverages for 24 hours following surgery.       Slowly progress to your regular diet as you feel able. It's not unusual to feel nauseated and/or vomit after receiving anesthesia.  If you develop these symptoms, drink clear liquids (apple juice, ginger ale, broth, 7-up, etc. ) until you feel better.  If your nausea and vomiting persists for 24 hours, please notify your surgeon.        All narcotic pain medications, along with inactivity and anesthesia, can cause constipation. Drinking plenty of liquids and increasing fiber intake will help.      For any questions of a medical nature, call your surgeon.      Do not make important decisions for 24 hours.      If you had general anesthesia, you may have a sore throat for a couple of days related to the breathing tube used during surgery.  You may use Cepacol lozenges to help with this discomfort.  If it worsens or if you develop a fever, contact your surgeon.        If you feel your pain is not well managed with the pain medications prescribed by your surgeon, please contact your surgeon's office to let them know so they can address your concerns.       CoVid 19 Information    We want to give you information regarding Covid. Please consult your primary care provider with any questions you might have.     Patient who have symptoms (cough, fever, or shortness of breath), need to isolate for 7 days from when symptoms started OR 72 hours after fever resolves (without fever reducing medications) AND improvement of respiratory symptoms (whichever is longer).      Isolate yourself at home (in own room/own bathroom if possible)    Do Not allow any visitors    Do Not go to work or school    Do Not go to Temple,  centers, shopping, or other public places.    Do Not shake hands.    Avoid close and intimate contact with others (hugging, kissing).    Follow CDC recommendations for household cleaning of frequently touched services.     After the initial 7 days, continue to isolate yourself from household members as much as possible. To continue decrease the risk of community spread and exposure, you and any members of your household should limit activities in public for 14 days after starting home isolation.     You can reference the following CDC link for helpful home isolation/care tips:  https://www.cdc.gov/coronavirus/2019-ncov/downloads/10Things.pdf    Protect Others:    Cover Your Mouth and Nose with a mask, disposable tissue or wash cloth to avoid spreading germs to others.    Wash your hands and face frequently with soap and water    Call Your Primary Doctor If: Breathing difficulty develops or you become worse.    For more information about COVID19 and options for caring for yourself at home, please visit the CDC website at https://www.cdc.gov/coronavirus/2019-ncov/about/steps-when-sick.html  For more options for care at [facility], please visit our website at  [email or web address]    [facility]  Discharge Instructions: Post-Operative Laparoscopic Appendectomy     ACTIVITY    Expect to feel tired after your surgery.  This will gradually resolve.    Take frequent, short walks and increase your activity gradually.      Avoid strenuous physical activity or heavy lifting greater than 15-20 lbs. for 2-3 weeks.  You may climb stairs.    You may drive without restrictions when you are not using any prescription pain medication and comfortable in a car.    You may return to work/school when you are comfortable without any prescription pain medication.    WOUND CARE    You may remove your outer dressing or Band-Aids and shower 48 hours after the surgery.  Pat your incisions dry and leave open to air.  Re-apply dressing (Band-aid or gauze/tape) as needed for comfort or drainage.    You may have steri-strips (looks like white tape) on your incision.  You may peel off the steri-strip 2 weeks after surgery if they have not peeled off on their own.    Do not soak your incisions in a tub or pool for 2 weeks.     Do not apply any lotions, creams, or ointments to your incisions.    A ridge under incisions is normal and will gradually resolve.    DIET    Start with liquids, then gradually resume your regular diet as tolerated.  Avoid heavy, spicy, and greasy meals for 2-3 days.    Drink plenty of liquids to stay hydrated.     PAIN    Expect some tenderness and discomfort at the incision sites.  Use the prescribed pain medication at your discretion.  Expect gradual resolution of your pain over several days.    You may take ibuprofen with food (unless you have been told not to) instead of or in addition to your prescribed pain medication.  If you are taking Norco or Percocet, do not take any additional acetaminophen/APAP/Tylenol.    Do not drink alcohol or drive while you are taking pain medications.    You may apply ice to  your incisions in 20 minute intervals as needed for the next 48 hours.  After that time, consider switching to heat if you prefer.    EXPECTATIONS    Pain medications can cause constipation.  Limit use when possible.  Take over the counter stool softener/stimulant, such as Colace or Senna, 1-2 times a day with plenty of water.  You may take a mild over the counter laxative, such as Miralax or a suppository, as needed.  You may discontinue these medications once you are having regular bowel movements and/or are no longer taking your narcotic pain medication.      You may have shoulder or upper back discomfort due to the gas used in surgery.  This is temporary and should resolve in 48-72 hours.  Short, frequent walks may help with this.      FOLLOW UP    Our office will contact you approximately 2 weeks to check on your progress and answer any questions you may have.  If you are doing well, you will not need to return for a follow up appointment.  If any concerns are identified over the phone, we will help you make an appointment to see a provider.    If you have not received a phone call, have any questions or concerns, or would like to be seen , please call us at [phone] and ask to speak with our nurse.  We are located at [address] [place] [address]    CALL OUR OFFICE IF YOU HAVE:     Chills or fever above 101.5 F.    Increased redness or drainage at your incisions.    Significant bleeding.    Pain not relieved by your pain medication or rest.    Increasing pain after the first 48 hours.    Any other concerns or questions.      Revised January 2018    **If you have concerns or questions about your procedure,    please contact Dr Gil at  [phone]**

## 2020-08-08 NOTE — OP NOTE
PREOPERATIVE DIAGNOSIS: Acute appendicitis.      POSTOPERATIVE DIAGNOSIS: Acute appendicitis.      PROCEDURE: Laparoscopic appendectomy.      SURGEON: Lisa Gil MD     ASSISTANT:  Joshua Villa MD [facility] Resident  The physician s assistant was medically necessary for their expertise in camera management, suctioning and retraction.    ANESTHESIA: GET.     COMPLICATIONS: None.     BLOOD LOSS: Minimal.     FINDINGS: Early acute appendicitis, no perforation. Proximal jejunal small bowel slightly irregular with possible fecalization present.     INDICATIONS:Rachel Danielle is a 25 year old  with one day history of abdominal pain. An abdominal CT was performed which showed acute appendicitis. I explained the risks, benefits, complications including but not limited to bleeding, infection, possible need to open, possible postop hematoma, seroma, bowel or bladder injury, all which require additional procedures. The patient did agree and did sign consent.       DETAILS OF PROCEDURE: The patient was brought to the operating room per anesthesia, placed in supine position, intubated without difficulty. They were given perioperative antibiotics.  The patient was prepped and draped in the usual fashion using ChloraPrep and the surgical timeout was then performed, verifying the correct surgeon, site, procedure and patient. All in the room were in agreement.    A 5mm 0 degree laparoscope was inserted and the visiport used to obtain entrance to the abdomen. Insufflation was connected and the abdomen insufflated. Initial evaluation of the abdomen revealed no inflammation in the right lower quadrant and revealed no trocar injuries. The abdomen was insufflated with pressure of 15, which the patient tolerated well, a 2nd 5mm port was placed suprapubically and a 12mm port placed infraumbilically under direct visualization. The appendix was found and appeared mildly hyperemic at the midportion of the appendix and mildly  dilated. It was not adherent to the lateral wall. The base of the cecum was not  thickened. The terminal ileum was normal. There was no perforation. A mesenteric window was created at the base of the cecum.  An Endo-KANNAN blue load was fired across this area without issues. Once this was done, the appendix was freed of the lateral wall with blunt dissection. The appendiceal mesentery was delineated and a KANNAN white load was fired across this. The staple line was inspected and found to be oozing. This was controlled with several 10mm clips with excellent hemostasis.  The appendix was placed an EndoCatch bag and removed through the umbilical trocar. The area was explored. Staple lines were completely intact. There was no bleeding. The right lower quadrant was irrigated with saline and suctioned.The pelvis showed no acute findings, pictures were taken of ovaries and uterus per patient request. The small bowel was ran from terminal ileum to ligament of treitz to ensure no additional pathology. The small bowel was mildly distended distally and decompressed at the mid jejunum. The proximal jejunum was slightly abnormal in appearance and with laparoscopic palpation seems may have some fecalization within the bowel. The wall was a bit irregular but there was no creeping fat noted. All trocars were taken out under direct visualization. The fascia was closed with an 0 vicryl sutures using the margot gertrudis device in a figure of eight fashion.     Marcaine 0.5% injected to all the wounds. They were irrigated and closed with 4-0 Monocryl in subcuticular fashion.Steri strips and bandaids were applied. The patient tolerated the procedure well and was awoken from anesthesia and transferred to PACU in stable condition. All sponge, instrument, and needle counts were correct at the conclusion of the procedure.      Lisa Gil MD  [facility]  [phone]

## 2020-08-08 NOTE — PROVIDER NOTIFICATION
MD Notification    Notified Person: MD    Notified Person Name: Dr. De Los Santos    Notification Date/Time: 18:22, 8/8/2020    Notification Interaction: phone call    Purpose of Notification: Bladder scan 588 ml, no orders for straight cath. Emesis post zofran/compazine, could we get orders for reglan? Minimal PO intake, could we get fluids restarted? Thanks    Orders Received: orders placed.     Comments:

## 2020-08-08 NOTE — PLAN OF CARE
A & O x 4, VSS, no c/o pain once arriving to the floor. Up ad domingo.  L PIV infusing NS @ 100 mL/hr. Has been NPO. Surgery to see and plan for appendectomy today.

## 2020-08-08 NOTE — PHARMACY-ADMISSION MEDICATION HISTORY
Pharmacy Medication History  Admission medication history interview status for the 8/7/2020  admission is complete. See EPIC admission navigator for prior to admission medications     Medication history sources: Patient, Surescripts and Care Everywhere  Medication history source reliability: Good  Adherence assessment: No meds at home    Significant changes made to the medication list:  Removed aripiprazole and fluoxetine from medication list. Patient has not used these meds in over a year.   These medications were ordered for inpatient use. Paged general surgery to discontinue these medications.     Medication reconciliation completed by provider prior to medication history? Yes    Time spent in this activity: 10 min      Prior to Admission medications    Not on File

## 2020-08-08 NOTE — ED NOTES
"[facility]  ED Nurse Handoff Report    ED Chief complaint: Abdominal Pain      ED Diagnosis:   Final diagnoses:   Acute appendicitis with localized peritonitis, without perforation, abscess, or gangrene       Code Status: Full Code    Allergies:   Allergies   Allergen Reactions     Tree Nuts [Nuts] Swelling     Throat and tongue swell up.       Patient Story: Pt presents with RLQ abd pain since about 1400 today. She reports that she took Plan B last week with her period ending today. Describes light spotting.   Focused Assessment:  RLQ pain.    Treatments and/or interventions provided: 1L NS IV, 0.5 mg IV Dilaudid, 15mg IV Toradol, 4mg IV Zofran, 1g IV Invanz.  Patient's response to treatments and/or interventions: Pain and nausea well controlled.    To be done/followed up on inpatient unit:  Pain control, Surgery.    Does this patient have any cognitive concerns?: none.    Activity level - Baseline/Home:  Independent  Activity Level - Current:   Independent    Patient's Preferred language: English   Needed?: No    Isolation: None  Infection: Not Applicable  Asymptomatic COVID pending  Bariatric?: No    Vital Signs:   Vitals:    08/07/20 2255 08/07/20 2345 08/08/20 0006 08/08/20 0100   BP: (!) 150/77      Pulse:   97    Resp: 20      Temp: 98.2  F (36.8  C)      TempSrc: Oral      SpO2: 98% 98%  97%   Height: 1.626 m (5' 4\")          Cardiac Rhythm:     Was the PSS-3 completed:   Yes  What interventions are required if any?               Family Comments: Significant other at bedside.  OBS brochure/video discussed/provided to patient/family: N/A              For the majority of the shift this patient's behavior was Green.   Behavioral interventions performed were .    ED NURSE PHONE NUMBER: [phone]         "

## 2020-08-08 NOTE — H&P
General Surgery H&P    Rachel Danielle MRN# [id number]   Age: 25 year old YOB: 1995     Date of Admission: 8/7/2020                         Assessment and Plan:   Assessment:   Rachel is a 25 year old previously healthy female who presents with acute onset RLQ pain, and vomiting. Afebrile, WBC 13.4, LFTs and lipase WNL. CT scan with dilated appendix, overall clinical picture consistent with early acute appendicitis.      Plan:   NPO, OK for PO pain meds  OR for lap appy today  IV antibiotics, received ertapenem at 2am, should cover her for 24hrs  COVID19 testing  UPT negative             Chief Complaint:   RLQ pain, vomiting     History is obtained from the patient         History of Present Illness:   This patient is a 25 year old female who presented 8/7/20 with acute RLQ pain since 2pm. Pain is constant and slowly worsening. Additionally she has felt constipated and vomited x1 earlier.  She was concerned this may be related to taking plan B 1 week ago, but her period ended today and this pain started later.  She's had a D&C but no other surgical history.     Currently she endorses severe RLQ after waking up, pain was well controlled prior to falling asleep. She denies n/v, dysuria, hematuria, hematochezia. She had some very light spotting prior to coming to the hospital.  She endorses some shortness of breath this morning due to pain, but denies chest pain.         Past Medical History:   Suicidal ideation  MDD          Past Surgical History:     Past Surgical History:   Procedure Laterality Date     DILATION AND CURETTAGE SUCTION WITH ULTRASOUND GUIDANCE N/A 3/12/2018    Procedure: DILATION AND CURETTAGE SUCTION WITH ULTRASOUND GUIDANCE;;  Surgeon: Lukas Tsai MD;  Location: [facility]           Medications:   No current facility-administered medications on file prior to encounter.   ARIPiprazole (ABILIFY) 2 MG tablet, Take 1 tablet (2 mg) by mouth daily  FLUoxetine (PROZAC) 10 MG capsule,  "Take 1 capsule (10 mg) by mouth daily    Patient denies currently taking any medications despite what's listed in the EHR      Allergies:      Allergies   Allergen Reactions     Tree Nuts [Nuts] Swelling     Throat and tongue swell up.            Social History:   Every day smoker, denies alcohol use. Marijuana use but no other illicit drug use.          Family History:   No family history of anesthetic problems. Father  suddenly and unexpectedly from a blood clot when he was 45, she thinks his mom  of the same thing. She does not remember any family workup for clotting disorders. No early heart attacks or strokes in direct family.          Review of Systems:   Ten point Review of Systems is negative other than noted in the HPI          Physical Exam:   Gen:  This is a well developed, well nourished, in obvious discomfort which started when she woke up just recently.   Blood pressure 124/72, pulse 75, temperature 98.4  F (36.9  C), temperature source Oral, resp. rate 18, height 1.626 m (5' 4\"), weight 63.4 kg (139 lb 11.2 oz), SpO2 98 %, not currently breastfeeding.  HEENT - Normocephalic, mask over face and nose  Lungs - Slightly tachpneic related to pain,   Heart - Regular rate & rhythm, distal pulses intact, no edema in BLE  Abdomen: Soft, non-distended. Severe tenderness in RLQ over McBurney's point. Nontender in the rest of her abdomen, but slight rebound tenderness.  Extremities: Moving all extremities spontaneously, no long bone abnormalities          Data:   WBC -   WBC   Date Value Ref Range Status   2020 13.4 (H) 4.0 - 11.0 10e9/L Final   ], HgB -   Hemoglobin   Date Value Ref Range Status   2020 13.3 11.7 - 15.7 g/dL Final   ]   Liver Function Studies -   Recent Labs   Lab Test 20  2338   PROTTOTAL 7.6   ALBUMIN 4.1   BILITOTAL 0.5   ALKPHOS 65   AST 22   ALT 13     CT scan of the abdomen:   1.  Mildly dilated distal appendix, but no significant periappendiceal inflammatory " change. Recommend clinical correlation.     2.  Multiple tiny nonobstructing stones in the left kidney.   Ultrasound Transvaginal:  1.  No evidence of ovarian torsion on either side.     2.  Trace presumably physiologic free fluid.     3.  Normal sonographic appearance of the uterus and endometrium.          Discussed with Dr. Gil.    Joshua Villa MD  General Surgery Resident PGY-4  [phone]

## 2020-08-08 NOTE — ED PROVIDER NOTES
History     Chief Complaint:  Abdominal Pain      HPI   Rachel Danielle is a 25 year old female who presents with right lower quadrant abdominal pain. The pain started at 1400 today and progressively got worse until , where it has remained at a constant pain level. She states it is a dull pain that is intermittent. The patient also notes that she has had constipation since . The patient endorses numbness in her toes, fingertips, and legs. The patient states she vomited on the way here. The patient denies dysuria. The patient took Plan B on Monday or Tuesday, and her forced period has ended today.  She is concerned this might be from an ovarian cyst, but she has not had pain like this before.  Her initial pain was more of a generalized discomfort and now her pain has localized to the right lower quadrant region.  She denies any known fevers or other complaints.    Allergies:  No Known Drug Allergies     Medications:    Abilify  Prozac     Past Medical History:    Suicide ideation  Missed     Past Surgical History:    Dilation and Curettage    Family History:    No past pertinent family history.    Social History:  The patient was accompanied to the ED by her significant other.  Smoking Status: Current Some Day Smoker  Smokeless tobacco: Never Used  Alcohol Use: No  Drug Use: Yes (Marijuana)  PCP:  No Ref-Primary, Physician  Marital Status:  Single [1]     Review of Systems   Gastrointestinal: Positive for abdominal pain, constipation and vomiting.   Genitourinary: Negative for dysuria.   Neurological: Positive for numbness.   All other systems reviewed and are negative.      Physical Exam     Patient Vitals for the past 24 hrs:   BP Temp Temp src Pulse Heart Rate Resp SpO2 Height Weight   20 124/72 98.4  F (36.9  C) Oral 75 -- 18 98 % -- --   20 0313 -- -- -- -- -- -- -- -- 63.4 kg (139 lb 11.2 oz)   20 0100 -- -- -- -- -- -- 97 % -- --   20 0006 -- -- -- 97 98  "-- -- -- --   08/07/20 2345 -- -- -- -- -- -- 98 % -- --   08/07/20 2255 (!) 150/77 98.2  F (36.8  C) Oral -- 127 20 98 % 1.626 m (5' 4\") --       Physical Exam  Nursing note and vitals reviewed.  Constitutional:  Appears uncomfortable. Oriented to person, place, and time. Cooperative.   HENT:   Nose:    Nose normal.   Mouth/Throat:   Mucous membranes are normal.   Eyes:    Conjunctivae normal and EOM are normal.      Pupils are equal, round, and reactive to light.   Neck:    Trachea normal.   Cardiovascular:  Tachycardic. No murmur heard.  Pulmonary/Chest:  Effort normal and breath sounds normal.   Abdominal:   Tenderness to palpation to right lower quadrant and pelvic region. Soft. Normal appearance and bowel sounds are normal.      There is no rebound and no CVA tenderness.   Musculoskeletal:  Extremities atraumatic x 4.   Lymphadenopathy:  No cervical adenopathy.   Neurological:   Alert and oriented to person, place, and time. Normal strength.      No cranial nerve deficit or sensory deficit. GCS eye subscore is 4. GCS verbal subscore is 5. GCS motor subscore is 6.   Skin:    Skin is intact. No rash noted.   Psychiatric:   Anxious.      Emergency Department Course     Imaging:  Radiology findings were communicated with the patient who voiced understanding of the   findings.    CT Abdomen Pelvis w contrast   1.  Mildly dilated distal appendix, but no significant periappendiceal inflammatory change. Recommend clinical correlation.     2.  Multiple tiny nonobstructing stones in the left kidney.  Reading per radiology    Pelvis US, complete with transvaginal and doppler  1.  No evidence of ovarian torsion on either side.     2.  Trace presumably physiologic free fluid.     3.  Normal sonographic appearance of the uterus and endometrium.  Reading per radiology.    Laboratory:  Laboratory findings were communicated with the patient who voiced understanding of the findings.    CBC: WBC 13.4 (H) o/w WNL (HGB 13.3, PLT " 343)  CMP: Glucose 103 (H), o/w WNL (Creatinine 0.73)  Lipase: 65 (L)  HCG Qualitative: negative.    UA with micro: Ketones 5 (A), Blood Trace (A), pH 8.0 (H), Mucous Present (A), Amorphous Crystals Few (A),  o/w negative    COVID-19 Virus (Coronavirus) by PCR: Pending.     Interventions:  2338 0.9% NaCl bolus 1000 mL IV  2333 Dilaudid 0.5 mg IV  2334 Toradol 15 mg IV  2334 Zofran 4 mg IV  0206 Invanz 1 g IV    Emergency Department Course:  Past medical records, nursing notes, and vitals reviewed.    (2304)   I performed an exam of the patient as documented above. History obtained from patient.       The patient was sent for a CT Abdomen Pelvis w Contrast and US Pelvis while in the emergency department, results above.     IV was inserted and blood was drawn for laboratory testing, results above.   The patient provided a urine sample here in the emergency department. This was sent for laboratory testing, findings above.     (0204)   I spoke with Dr. Gil of the general surgery service regarding patient's presentation, findings, and plan of care.     Findings and plan explained to the Patient who consents to admission. Discussed the patient with Dr. Gil, who will admit the patient to a observation bed for further monitoring, evaluation, and treatment.    I personally reviewed the laboratory and imaging results with the Patient and answered all related questions prior to admission.     Impression & Plan     Medical Decision Making:  This is a 25-year-old female came in for further evaluation of right lower quadrant and right pelvic pain.  I wanted to rule out pregnancy related causes as well as UTI.  I also considered the possibility of this being secondary to an ovarian cyst, ruptured ovarian cyst, ovarian torsion, or possibly acute appendicitis.  I proceeded with the above work-up including the blood work and urine as well as a pelvic ultrasound initially.  When her ultrasound returned unremarkable, I  then proceeded with a CT scan, as I was concerned about appendicitis.  Her CT scan does show what appears to be acute appendicitis.  She was provided IV Invanz, and I subsequently spoke with the on-call general surgeon, Dr. Gil, will be taking care of the patient and will bring her to the OR for definitive management later today.    Diagnosis:    ICD-10-CM    1. Acute appendicitis with localized peritonitis, without perforation, abscess, or gangrene  K35.30 Asymptomatic COVID-19 Virus (Coronavirus) by PCR     Case Request: APPENDECTOMY, LAPAROSCOPIC     Case Request: APPENDECTOMY, LAPAROSCOPIC       Disposition:  Admitted to observation bed.      Scribe Disclosure:  IoDmenica, am serving as a scribe at 11:05 PM on 8/7/2020 to document services personally performed by Kyle Canales MD based on my observations and the provider's statements to me.     Scribe Disclosure:  Christine JERRY, am serving as a scribe at 3:28 AM on 8/8/2020 to document services personally performed by Kyle Canales MD based on my observations and the provider's statements to me.     8/7/2020    EMERGENCY DEPARTMENT       Kyle Canales MD  08/08/20 0458

## 2020-08-08 NOTE — BRIEF OP NOTE
[facility]    Brief Operative Note    Pre-operative diagnosis: Appendicitis [K37]  Post-operative diagnosis: Same as pre-operative diagnosis    Procedure(s):  APPENDECTOMY, LAPAROSCOPIC     Surgeon(s) and Role:     * Lisa Gil MD - Primary     * Joshua Villa MD - Assisting     Anesthesia: General     Estimated blood loss: 5 mL    Drains: None    Specimens:   ID Type Source Tests Collected by Time Destination   A : APPENDIX Organ Appendix SURGICAL PATHOLOGY EXAM Lisa Gil MD 8/8/2020  1:14 PM      Findings: Early acute appendicitis. Ovaries and uterus examined and appeared grossly normal, no evidence of PID either. Entire small bowel ran without evidence of Meckel's, obstruction or other pathology. Majority of SB distended with intermittent areas of decompressed bowel. Large bowel also appeared quite distended throughout, possible element of enteritis as well.    Complications: None     Implants: * No implants in log *    Post-op plan:  - OK to discharge home  - Plan on sending with oxycodone and stool softeners  - If patient continues to feel discomfort, would recommend referral to GI to assess for possible dysmotility based on appearance of bowels  - Consider outpatient workup of family history of blood clots (although patient quite oozy with skin incisions)  - avoid toradol post op    Joshua Villa MD  General Surgery Resident, PGY-4  [phone]

## 2020-08-08 NOTE — ANESTHESIA PROCEDURE NOTES
Airway   Date/Time: 8/8/2020 12:28 PM   Patient location during procedure: OR    General Information and Staff   Performed: CRNA     Consent for Airway   Urgency: elective        Indications and Patient Condition  Indications for airway management: austin-procedural  Induction type:intravenous  Mask difficulty assessment: easy    Final Airway Details  Final airway type: endotracheal airway  Successful airway:ETT  ETT size (mm): 7.0 Cuffed: yes   Cuff volume (mL): 7  Blade: Hyatt  Blade size: #2  Successful intubation technique: asleep, cricoid pressure and direct  Endotracheal tube insertion site: center of mouth   Measured from: teeth  ETT to teeth (cm): 22  Grade View of Cords: 1Number of attempts at approach: 1    Secured with:tape  Ease of procedure: easy  Dentition: Intact and Unchanged

## 2020-08-08 NOTE — DISCHARGE SUMMARY
General Surgery Service Discharge Summary:     NAME: Rachel Danielle   MRN: [id number]   : 1995   PCP: Physician No Ref-Primary    DATE OF ADMISSION: 2020       Procedure/Surgery Information   Procedure: Procedure(s):  APPENDECTOMY, LAPAROSCOPIC   Surgeon(s): Surgeon(s) and Role:     * Lisa Gil MD - Primary     * Joshua Villa MD - Assisting   Specimens: ID Type Source Tests Collected by Time Destination   A : APPENDIX Organ Appendix SURGICAL PATHOLOGY EXAM Lisa Gil MD 2020  1:14 PM             PRE/POSTOPERATIVE DIAGNOSES:    Acute appendicitis    PROCEDURES PERFORMED, 2020:   Laparoscopic appendecetomy    INTRAOPERATIVE FINDINGS:   The appendix was found and appeared mildly hyperemic at the midportion of the appendix and mildly dilated. It was not adherent to the lateral wall. The base of the cecum was not  thickened. The terminal ileum was normal. There was no perforation. Ovaries and uterus examined and appeared grossly normal, no evidence of PID either. Entire small bowel ran without evidence of Meckel's, obstruction or other pathology. Majority of SB distended with intermittent areas of decompressed bowel that appeared consistent with fecalization.    POSTOPERATIVE COMPLICATIONS: None None    DATE OF DISCHARGE: 2020    ADMISSION HPI (from 2020):   Rachel is a 25 year old previously healthy female who presents with acute onset RLQ pain, and vomiting. Afebrile, WBC 13.4, LFTs and lipase WNL. CT scan with dilated appendix, overall clinical picture consistent with early acute appendicitis. I explained the risks, benefits, complications including but not limited to bleeding, infection, possible need to open, possible postop hematoma, seroma, bowel or bladder injury, all which require additional procedures. The patient did agree and did sign consent.     HOSPITAL COURSE: Rachel Danielle is a 25 year old female who was admitted on 2020 with acute  "RLQ pain. She was given ertapenem, workup significant for WBC 13.4, LFTs and lipase WNL. CT scan with dilated appendix, transvaginal ultrasound negative. She underwent a laparoscopic appendectomy. She tolerated the procedure well and postoperatively was discharged home.  - Plan on sending with oxycodone and stool softeners  - If patient continues to feel discomfort, would recommend referral to GI to assess for possible dysmotility based on appearance of bowels  - f/u in 1-2 weeks with a phone call  - Consider outpatient workup of family history of blood clots (although patient quite oozy with skin incisions)  - avoid toradol post op    Time Spent on this Encounter   I, Joshua Villa MD, personally saw the patient today and spent less than or equal to 30 minutes discharging this patient.    Physical Exam:  BP (!) 133/91   Pulse 74   Temp 97.9  F (36.6  C)   Resp 20   Ht 1.626 m (5' 4\")   Wt 63.4 kg (139 lb 11.2 oz)   LMP 08/05/2020 (Exact Date)   SpO2 100%   BMI 23.98 kg/m    Patient not examined post-operatively.  Morning examination:  HEENT - Normocephalic, mask over face and nose  Lungs - Slightly tachpneic related to pain,   Heart - Regular rate & rhythm, distal pulses intact, no edema in BLE  Abdomen: Soft, non-distended. Severe tenderness in RLQ over McBurney's point. Nontender in the rest of her abdomen, but slight rebound tenderness.  Extremities: Moving all extremities spontaneously, no long bone abnormalities    PAST MEDICAL HISTORY:  Past Medical History:   Diagnosis Date     Missed ab        PAST SURGICAL HISTORY:  Past Surgical History:   Procedure Laterality Date     DILATION AND CURETTAGE SUCTION WITH ULTRASOUND GUIDANCE N/A 3/12/2018    Procedure: DILATION AND CURETTAGE SUCTION WITH ULTRASOUND GUIDANCE;;  Surgeon: Lukas Tsai MD;  Location: [facility]       Labs:  Recent Labs   Lab Test 08/07/20  3998 06/13/18  0803   WBC 13.4* 5.9   RBC 4.32 4.51   HGB 13.3 12.1   HCT 40.0 38.2   MCV 93 85 "   MCH 30.8 26.8   MCHC 33.3 31.7    375     Recent Labs   Lab Test 08/07/20  2338 06/13/18  0803   POTASSIUM 4.2 3.7   CHLORIDE 107 105   BUN 8 10       DISCHARGE INSTRUCTIONS:  Discharge Orders      No lifting     No lifting over 15 lbs and no strenuous physical activity for 4 weeks     Diet Instructions    Resume pre-procedure diet     Shower    No shower for 48 hours post procedure. May shower Postoperative Day (POD) 2.  OK to let warm soapy water run over incisions, do not scrub them.  Do not soak your incisions in water, take baths or swim in a lake/pool for 2 weeks.     Dressing    OK to remove outer dressing 48 hrs after surgery, the steristrips underneath will fall off on their own in about one week.     Discharge Instructions    Follow up with a phone call from Dr. Gil or one of her partners in 1-2 weeks    Clinic information:  [address] [address]  Clinic: [phone]     Discharge Instructions    We recommend starting stool softeners for the post op period.  If you continue not feeling well or feel ongoing constipation issues, we would recommend a referral to GI doctors to assess for possible dysmotility.     We also recommend talking to your primary care physician regarding the family history of blood clots and whether or not you and other family members need to be screened.     Discharge Medications   Current Discharge Medication List      START taking these medications    Details   oxyCODONE (ROXICODONE) 5 MG tablet Take 1-2 tablets (5-10 mg) by mouth every 3 hours as needed for pain (Moderate to Severe)  Qty: 12 tablet, Refills: 0    Associated Diagnoses: Acute appendicitis with localized peritonitis, without perforation, abscess, or gangrene      SENNA-docusate sodium (SENNA S) 8.6-50 MG tablet Take 1 tablet by mouth 2 times daily  Qty: 60 tablet, Refills: 0    Comments: Hold if you are having diarrhea  Associated Diagnoses: Acute appendicitis with localized peritonitis,  without perforation, abscess, or gangrene           Allergies   Allergies   Allergen Reactions     Tree Nuts [Nuts] Swelling     Throat and tongue swell up.          Joshua Villa MD  Surgery, PGY4

## 2020-08-08 NOTE — ANESTHESIA PREPROCEDURE EVALUATION
Anesthesia Pre-Procedure Evaluation    Patient: Rachel Danielle   MRN: [id number] : 1995          Preoperative Diagnosis: Appendicitis [K37]    Procedure(s):  APPENDECTOMY, LAPAROSCOPIC    Past Medical History:   Diagnosis Date     Missed ab      Past Surgical History:   Procedure Laterality Date     DILATION AND CURETTAGE SUCTION WITH ULTRASOUND GUIDANCE N/A 3/12/2018    Procedure: DILATION AND CURETTAGE SUCTION WITH ULTRASOUND GUIDANCE;;  Surgeon: Lukas Tsai MD;  Location: [facility]       Anesthesia Evaluation     .             ROS/MED HX    ENT/Pulmonary: Comment: Marijuana    (+)tobacco use, Current use , . .   (-) sleep apnea   Neurologic:  - neg neurologic ROS     Cardiovascular:  - neg cardiovascular ROS       METS/Exercise Tolerance:     Hematologic:  - neg hematologic  ROS       Musculoskeletal:         GI/Hepatic:     (+) appendicitis,      (-) GERD   Renal/Genitourinary:  - ROS Renal section negative       Endo:  - neg endo ROS       Psychiatric:         Infectious Disease:         Malignancy:         Other:                          Physical Exam  Normal systems: dental    Airway   Mallampati: II  TM distance: >3 FB  Neck ROM: full    Dental     Cardiovascular   Rhythm and rate: regular      Pulmonary    breath sounds clear to auscultation            Lab Results   Component Value Date    WBC 13.4 (H) 2020    HGB 13.3 2020    HCT 40.0 2020     2020     2020    POTASSIUM 4.2 2020    CHLORIDE 107 2020    CO2 23 2020    BUN 8 2020    CR 0.73 2020     (H) 2020    JENNIFER 9.2 2020    ALBUMIN 4.1 2020    PROTTOTAL 7.6 2020    ALT 13 2020    AST 22 2020    ALKPHOS 65 2020    BILITOTAL 0.5 2020    LIPASE 65 (L) 2020    TSH 0.74 2018    HCG Negative 2018    HCGS Negative 2020       Preop Vitals  BP Readings from Last 3 Encounters:   20 118/68  "  06/13/18 (!) 142/101   03/12/18 105/74    Pulse Readings from Last 3 Encounters:   08/08/20 83   06/13/18 97      Resp Readings from Last 3 Encounters:   08/08/20 16   06/15/18 16   03/12/18 18    SpO2 Readings from Last 3 Encounters:   08/08/20 98%   06/12/18 97%   03/12/18 97%      Temp Readings from Last 1 Encounters:   08/08/20 36.9  C (98.5  F) (Oral)    Ht Readings from Last 1 Encounters:   08/07/20 1.626 m (5' 4\")      Wt Readings from Last 1 Encounters:   08/08/20 63.4 kg (139 lb 11.2 oz)    Estimated body mass index is 23.98 kg/m  as calculated from the following:    Height as of this encounter: 1.626 m (5' 4\").    Weight as of this encounter: 63.4 kg (139 lb 11.2 oz).       Anesthesia Plan      History & Physical Review  History and physical reviewed and following examination; no interval change.    ASA Status:  2 .    NPO Status:  > 8 hours    Plan for General (ETT) with Intravenous and Propofol induction. Maintenance will be Balanced.    PONV prophylaxis:  Ondansetron (or other 5HT-3) and Dexamethasone or Solumedrol  Low dose propofol infusion for PONV prophylaxis (20-30 mcg/kg/min)          Postoperative Care  Postoperative pain management:  Multi-modal analgesia.      Consents  Anesthetic plan, risks, benefits and alternatives discussed with:  Patient..                 Moody Gregory MD  "

## 2020-08-09 VITALS
TEMPERATURE: 97.9 F | HEART RATE: 71 BPM | WEIGHT: 139.7 LBS | DIASTOLIC BLOOD PRESSURE: 70 MMHG | RESPIRATION RATE: 16 BRPM | OXYGEN SATURATION: 97 % | SYSTOLIC BLOOD PRESSURE: 113 MMHG | BODY MASS INDEX: 23.85 KG/M2 | HEIGHT: 64 IN

## 2020-08-09 LAB
ANION GAP SERPL CALCULATED.3IONS-SCNC: 5 MMOL/L (ref 3–14)
BASOPHILS # BLD AUTO: 0 10E9/L (ref 0–0.2)
BASOPHILS NFR BLD AUTO: 0.1 %
BUN SERPL-MCNC: 8 MG/DL (ref 7–30)
CALCIUM SERPL-MCNC: 8.8 MG/DL (ref 8.5–10.1)
CHLORIDE SERPL-SCNC: 107 MMOL/L (ref 94–109)
CO2 SERPL-SCNC: 24 MMOL/L (ref 20–32)
CREAT SERPL-MCNC: 0.66 MG/DL (ref 0.52–1.04)
DIFFERENTIAL METHOD BLD: ABNORMAL
EOSINOPHIL # BLD AUTO: 0 10E9/L (ref 0–0.7)
EOSINOPHIL NFR BLD AUTO: 0.1 %
ERYTHROCYTE [DISTWIDTH] IN BLOOD BY AUTOMATED COUNT: 12.7 % (ref 10–15)
GFR SERPL CREATININE-BSD FRML MDRD: >90 ML/MIN/{1.73_M2}
GLUCOSE SERPL-MCNC: 81 MG/DL (ref 70–99)
HCT VFR BLD AUTO: 36.1 % (ref 35–47)
HGB BLD-MCNC: 11.8 G/DL (ref 11.7–15.7)
IMM GRANULOCYTES # BLD: 0 10E9/L (ref 0–0.4)
IMM GRANULOCYTES NFR BLD: 0.2 %
LYMPHOCYTES # BLD AUTO: 1.5 10E9/L (ref 0.8–5.3)
LYMPHOCYTES NFR BLD AUTO: 11.1 %
MAGNESIUM SERPL-MCNC: 2 MG/DL (ref 1.6–2.3)
MCH RBC QN AUTO: 30.3 PG (ref 26.5–33)
MCHC RBC AUTO-ENTMCNC: 32.7 G/DL (ref 31.5–36.5)
MCV RBC AUTO: 93 FL (ref 78–100)
MONOCYTES # BLD AUTO: 0.9 10E9/L (ref 0–1.3)
MONOCYTES NFR BLD AUTO: 7.1 %
NEUTROPHILS # BLD AUTO: 10.7 10E9/L (ref 1.6–8.3)
NEUTROPHILS NFR BLD AUTO: 81.4 %
NRBC # BLD AUTO: 0 10*3/UL
NRBC BLD AUTO-RTO: 0 /100
PHOSPHATE SERPL-MCNC: 2.7 MG/DL (ref 2.5–4.5)
PLATELET # BLD AUTO: 329 10E9/L (ref 150–450)
POTASSIUM SERPL-SCNC: 3.8 MMOL/L (ref 3.4–5.3)
RBC # BLD AUTO: 3.89 10E12/L (ref 3.8–5.2)
SODIUM SERPL-SCNC: 136 MMOL/L (ref 133–144)
WBC # BLD AUTO: 13.1 10E9/L (ref 4–11)

## 2020-08-09 PROCEDURE — 25800030 ZZH RX IP 258 OP 636: Performed by: SURGERY

## 2020-08-09 PROCEDURE — 84100 ASSAY OF PHOSPHORUS: CPT | Performed by: SURGERY

## 2020-08-09 PROCEDURE — 36415 COLL VENOUS BLD VENIPUNCTURE: CPT | Performed by: SURGERY

## 2020-08-09 PROCEDURE — 25000132 ZZH RX MED GY IP 250 OP 250 PS 637: Performed by: SURGERY

## 2020-08-09 PROCEDURE — 25000125 ZZHC RX 250: Performed by: SURGERY

## 2020-08-09 PROCEDURE — 85025 COMPLETE CBC W/AUTO DIFF WBC: CPT | Performed by: SURGERY

## 2020-08-09 PROCEDURE — 25000128 H RX IP 250 OP 636: Performed by: SURGERY

## 2020-08-09 PROCEDURE — 96361 HYDRATE IV INFUSION ADD-ON: CPT

## 2020-08-09 PROCEDURE — G0378 HOSPITAL OBSERVATION PER HR: HCPCS

## 2020-08-09 PROCEDURE — 83735 ASSAY OF MAGNESIUM: CPT | Performed by: SURGERY

## 2020-08-09 PROCEDURE — 80048 BASIC METABOLIC PNL TOTAL CA: CPT | Performed by: SURGERY

## 2020-08-09 PROCEDURE — 96376 TX/PRO/DX INJ SAME DRUG ADON: CPT

## 2020-08-09 RX ORDER — OXYCODONE HYDROCHLORIDE 5 MG/1
5-10 TABLET ORAL EVERY 4 HOURS PRN
Status: DISCONTINUED | OUTPATIENT
Start: 2020-08-09 | End: 2020-08-09 | Stop reason: HOSPADM

## 2020-08-09 RX ORDER — SCOLOPAMINE TRANSDERMAL SYSTEM 1 MG/1
1 PATCH, EXTENDED RELEASE TRANSDERMAL
Status: DISCONTINUED | OUTPATIENT
Start: 2020-08-09 | End: 2020-08-09 | Stop reason: HOSPADM

## 2020-08-09 RX ADMIN — SCOPALAMINE 1 PATCH: 1 PATCH, EXTENDED RELEASE TRANSDERMAL at 09:00

## 2020-08-09 RX ADMIN — DOCUSATE SODIUM AND SENNOSIDES 2 TABLET: 8.6; 5 TABLET, FILM COATED ORAL at 09:00

## 2020-08-09 RX ADMIN — ACETAMINOPHEN 650 MG: 325 TABLET, FILM COATED ORAL at 11:57

## 2020-08-09 RX ADMIN — SODIUM CHLORIDE: 9 INJECTION, SOLUTION INTRAVENOUS at 07:49

## 2020-08-09 RX ADMIN — ACETAMINOPHEN 650 MG: 325 TABLET, FILM COATED ORAL at 07:49

## 2020-08-09 RX ADMIN — OXYCODONE HYDROCHLORIDE 5 MG: 5 TABLET ORAL at 00:01

## 2020-08-09 RX ADMIN — ONDANSETRON 4 MG: 2 INJECTION INTRAMUSCULAR; INTRAVENOUS at 00:24

## 2020-08-09 RX ADMIN — PROCHLORPERAZINE EDISYLATE 10 MG: 5 INJECTION INTRAMUSCULAR; INTRAVENOUS at 04:56

## 2020-08-09 RX ADMIN — OXYCODONE HYDROCHLORIDE 5 MG: 5 TABLET ORAL at 04:56

## 2020-08-09 RX ADMIN — HYDROMORPHONE HYDROCHLORIDE 0.5 MG: 1 INJECTION, SOLUTION INTRAMUSCULAR; INTRAVENOUS; SUBCUTANEOUS at 05:58

## 2020-08-09 NOTE — DISCHARGE SUMMARY
General Surgery Service Discharge Summary:     NAME: Rachel Danielle   MRN: [id number]   : 1995   PCP: Physician No Ref-Primary    DATE OF ADMISSION: 2020       Procedure/Surgery Information   Procedure: Procedure(s):  APPENDECTOMY, LAPAROSCOPIC   Surgeon(s): Surgeon(s) and Role:     * Lisa Gil MD - Primary     * Joshua Villa MD - Assisting   Specimens: ID Type Source Tests Collected by Time Destination   A : APPENDIX Organ Appendix SURGICAL PATHOLOGY EXAM Lisa Gil MD 2020  1:14 PM             PRE/POSTOPERATIVE DIAGNOSES:    Acute appendicitis    PROCEDURES PERFORMED, 2020:   Laparoscopic appendectomy    INTRAOPERATIVE FINDINGS:   The appendix was found and appeared mildly hyperemic at the midportion of the appendix and mildly dilated. It was not adherent to the lateral wall. The base of the cecum was not  thickened. The terminal ileum was normal. There was no perforation. Ovaries and uterus examined and appeared grossly normal, no evidence of PID either. Entire small bowel ran without evidence of Meckel's, obstruction or other pathology. Majority of SB distended with intermittent areas of decompressed bowel that appeared consistent with fecalization.    POSTOPERATIVE COMPLICATIONS: None    DATE OF DISCHARGE: 2020    ADMISSION HPI (from 2020): Rachel is a 25 year old previously healthy female who presents with acute onset RLQ pain, and vomiting. Afebrile, WBC 13.4, LFTs and lipase WNL. CT scan with dilated appendix, overall clinical picture consistent with early acute appendicitis. I explained the risks, benefits, complications including but not limited to bleeding, infection, possible need to open, possible postop hematoma, seroma, bowel or bladder injury, all which require additional procedures. The patient did agree and did sign consent.     HOSPITAL COURSE: Rachel Danielle is a 25 year old female who was admitted on 2020 with acute RLQ pain.  "She was given ertapenem, workup significant for WBC 13.4, LFTs and lipase WNL. CT scan with dilated appendix, transvaginal ultrasound negative. She underwent a laparoscopic appendectomy. She tolerated the procedure well. Post-op she had ongoing nausea, vomiting, and urinary retention requiring straight cath x1 and stayed overnight. On 8/9/20, she nausea was improved with a scopolamine patch, she was tolerating liquids, voiding without difficulty, ambulating in the halls and pain was managed with tylenol and oxycodone. By the afternoon, her nausea had remained well controlled and she was discharged home.  - Plan on sending with oxycodone and stool softeners  - If patient continues to feel discomfort, would recommend referral to GI to assess for possible dysmotility based on appearance of bowels  - f/u in 1-2 weeks with a phone call  - Consider outpatient workup of family history of blood clots (although patient quite oozy with skin incisions)    Time Spent on this Encounter   I, Joshua Villa MD, personally saw the patient today and spent less than or equal to 30 minutes discharging this patient.    Physical Exam:  /70 (BP Location: Left arm)   Pulse 71   Temp 97.9  F (36.6  C) (Oral)   Resp 16   Ht 1.626 m (5' 4\")   Wt 63.4 kg (139 lb 11.2 oz)   LMP 08/05/2020 (Exact Date)   SpO2 97%   BMI 23.98 kg/m      General: The patient is alert and oriented. Appropriate. No acute distress  Respiratory: Non-labored breathing with symmetric chest rise on room air   GI: Abdomen soft, non-distended, mild RLQ tenderness to light palpation. No guarding or rebound tenderness  Skin/incision: No skin lesions on exposure skin. Surgical port site dressings c/d/I, suprapubic steristrips with dried blood.    PAST MEDICAL HISTORY:  Past Medical History:   Diagnosis Date     Missed ab        PAST SURGICAL HISTORY:  Past Surgical History:   Procedure Laterality Date     DILATION AND CURETTAGE SUCTION WITH ULTRASOUND GUIDANCE " N/A 3/12/2018    Procedure: DILATION AND CURETTAGE SUCTION WITH ULTRASOUND GUIDANCE;;  Surgeon: Lukas Tsai MD;  Location: [facility]       Labs:  Recent Labs   Lab Test 08/09/20  0841 08/07/20  2338   WBC 13.1* 13.4*   RBC 3.89 4.32   HGB 11.8 13.3   HCT 36.1 40.0   MCV 93 93   MCH 30.3 30.8   MCHC 32.7 33.3    343     Recent Labs   Lab Test 08/09/20  0841 08/07/20  2338 06/13/18  0803   POTASSIUM 3.8 4.2 3.7   CHLORIDE 107 107 105   BUN 8 8 10       DISCHARGE INSTRUCTIONS:  Discharge Orders      No lifting     No lifting over 15 lbs and no strenuous physical activity for 4 weeks     Diet Instructions    Resume pre-procedure diet     Shower    No shower for 48 hours post procedure. May shower Postoperative Day (POD) 2.  OK to let warm soapy water run over incisions, do not scrub them.  Do not soak your incisions in water, take baths or swim in a lake/pool for 2 weeks.     Dressing    OK to remove outer dressing 48 hrs after surgery, the steristrips underneath will fall off on their own in about one week.     Discharge Instructions    Follow up with a phone call from Dr. Gil or one of her partners in 1-2 weeks    Clinic information:  [address] [address]  Clinic: [phone]     Discharge Instructions    We recommend starting stool softeners for the post op period.  If you continue not feeling well or feel ongoing constipation issues, we would recommend a referral to GI doctors to assess for possible dysmotility.     We also recommend talking to your primary care physician regarding the family history of blood clots and whether or not you and other family members need to be screened.     Reason for your hospital stay    You were hospitalized to have your appendix removed     Follow-up and recommended labs and tests     Follow up with a phone call in 1-2 weeks with Dr. Gil or one of her partners    Clinic information:  [address] [address]  Clinic: [phone]      Activity    Your activity upon discharge: activity as tolerated    No lifting > 15 lbs or strenuous activity for 4 weeks     Wound care and dressings    OK to remove outer dressing 48 hrs after surgery, the steristrips underneath will fall off on their own in about one week.     Full Code     Diet    Follow this diet upon discharge: Resume your previous diet     Discharge Medications   Current Discharge Medication List      START taking these medications    Details   oxyCODONE (ROXICODONE) 5 MG tablet Take 1-2 tablets (5-10 mg) by mouth every 3 hours as needed for pain (Moderate to Severe)  Qty: 12 tablet, Refills: 0    Associated Diagnoses: Acute appendicitis with localized peritonitis, without perforation, abscess, or gangrene      SENNA-docusate sodium (SENNA S) 8.6-50 MG tablet Take 1 tablet by mouth 2 times daily  Qty: 60 tablet, Refills: 0    Comments: Hold if you are having diarrhea  Associated Diagnoses: Acute appendicitis with localized peritonitis, without perforation, abscess, or gangrene           Allergies   Allergies   Allergen Reactions     Tree Nuts [Nuts] Swelling     Throat and tongue swell up.          Joshua Villa MD  Surgery PGY4

## 2020-08-09 NOTE — PLAN OF CARE
A & O X 4, VSS. POD 2 and has 3 lap sites covered with band aids, some dried drainage on RLQ. Urinary retention improving, but pt experienced increased nausea w/movement. Had 2 episodes of emesis each w/200 mL output. Gave prn IV zofran and reglan. Up w/SBA. C/o pain, gave prn po oxycodone and ice, later had breakthrough pain, so gave IV dilaudid x1. IVF infusing @ 125 ml/hr. Discharge pending improvement in nausea, retention and pain control.

## 2020-08-09 NOTE — PLAN OF CARE
A&Ox4. VSS on RA. Lap appendectomy completed today. 3 abdominal lab sites, WNL ex some dried drainage. Arrived back from PACU & began dry heaving & emesis x2. Zofran given w/ minimal effect, compazine given w/ some relief. MD notified, discharge order discontinued, Reglan added, & IVF restarted. MD notified for bladder scan 588ml, straight cath x1 for 700ml. Regular diet, minimal intake. SBA to ambulate. C/o moderate abdominal pain, oxy given x1 & dilaudid x1. L PIV infusing NS @125. Likely discharge home tomorrow AM pending diet toleration.

## 2020-08-09 NOTE — PROGRESS NOTES
"SURGERY PROGRESS NOTE    Subjective/Interval events:  Significant nausea post-op, feels better with the scopolamine patch, required straight cath once but has been able to void now. Still having right lower quadrant pain, tylenol helped, but she threw up shortly after the oxycodone so she doesn't think it had any effect. Tolerating liquids fine, voiding fine, passing gas, ambulated x1 in halls.     Objective:  /71 (BP Location: Right arm)   Pulse 71   Temp 98.9  F (37.2  C) (Oral)   Resp 16   Ht 1.626 m (5' 4\")   Wt 63.4 kg (139 lb 11.2 oz)   LMP 08/05/2020 (Exact Date)   SpO2 96%   BMI 23.98 kg/m    General: The patient is alert and oriented. Appropriate. No acute distress  Respiratory: Non-labored breathing with symmetric chest rise on room air   GI: Abdomen soft, non-distended, mild RLQ tenderness to light palpation. No guarding or rebound tenderness  Skin/incision: No skin lesions on exposure skin. Surgical port site dressings c/d/I, suprapubic steristrips with dried blood.    CBC RESULTS:   Recent Labs   Lab Test 08/09/20  0841   WBC 13.1*   RBC 3.89   HGB 11.8   HCT 36.1   MCV 93   MCH 30.3   MCHC 32.7   RDW 12.7        K 3.8, Na 136    ASSESSMENT/PLAN:  POD#1 lap appy for acute appendicitis. Significant post-op nausea, retention and pain are all improving. She wants to see how the morning goes and if the nausea stays under control and she tolerates the pain with oxycodone, she thinks she'd be ready to go home early afternoon.  - discontinue IVF  - continue scopolamine patch  - prn zofran  - tylenol, oxycodone  - encourage ambulation  - regular diet  - likely discharge later this afternoon     Joshua Villa MD  General Surgery Resident, PGY-4  [phone]    "

## 2020-08-09 NOTE — PROGRESS NOTES
Patient discharged at 2:31 PM to home. IV was discontinued. Denied pain at time of discharge. Belongings returned to patient.  Discharge instructions and medications reviewed with patient.  Patient verbalized understanding and all questions were answered.  Prescriptions given to patient.  At time of discharge, patient condition was stable and left the unit via wheelchair escorted by RN.

## 2020-08-09 NOTE — PROVIDER NOTIFICATION
MD Notification    Notified Person: MD    Notified Person Name: Joshua Villa     Notification Date/Time: 13:40, 8/9/2020    Notification Interaction: Text page    Purpose of Notification: Pt tolerating diet & requesting to be discharged home.     Orders Received: Discharge orders placed    Comments:

## 2020-08-11 LAB — COPATH REPORT: NORMAL

## 2020-08-24 ENCOUNTER — TELEPHONE (OUTPATIENT)
Dept: SURGERY | Facility: CLINIC | Age: 25
End: 2020-08-24

## 2020-08-24 NOTE — TELEPHONE ENCOUNTER
[facility]  Post op call note - Laparoscopic appendectomy    Rachel Danielle was called for an update regarding her recovery.  She underwent a laparoscopic appendectomy by Dr. Gil on 8/8/20.  The first call went to voicemail and a message for Rachel was left including the pathology results showing early appendicitis. I called again five minutes later and it again went to voicemail.    Joshua Villa MD  General Surgery Resident, PGY-4

## 2021-06-16 ENCOUNTER — HOSPITAL ENCOUNTER (EMERGENCY)
Facility: CLINIC | Age: 26
Discharge: HOME | End: 2021-06-16
Attending: EMERGENCY MEDICINE | Admitting: EMERGENCY MEDICINE
Payer: COMMERCIAL

## 2021-06-16 VITALS
OXYGEN SATURATION: 99 % | HEIGHT: 64 IN | DIASTOLIC BLOOD PRESSURE: 69 MMHG | HEART RATE: 84 BPM | BODY MASS INDEX: 23.9 KG/M2 | TEMPERATURE: 97.6 F | SYSTOLIC BLOOD PRESSURE: 116 MMHG | WEIGHT: 140 LBS | RESPIRATION RATE: 16 BRPM

## 2021-06-16 DIAGNOSIS — R45.851 SUICIDAL THOUGHTS: ICD-10-CM

## 2021-06-16 DIAGNOSIS — F41.9 ANXIETY: ICD-10-CM

## 2021-06-16 LAB
AMPHETAMINES UR QL SCN: NEGATIVE
BARBITURATES UR QL: NEGATIVE
BENZODIAZ UR QL: NEGATIVE
CANNABINOIDS UR QL SCN: POSITIVE
COCAINE UR QL: NEGATIVE
ETHANOL UR QL SCN: NEGATIVE
OPIATES UR QL SCN: NEGATIVE

## 2021-06-16 PROCEDURE — 80320 DRUG SCREEN QUANTALCOHOLS: CPT | Performed by: EMERGENCY MEDICINE

## 2021-06-16 PROCEDURE — 99283 EMERGENCY DEPT VISIT LOW MDM: CPT | Performed by: EMERGENCY MEDICINE

## 2021-06-16 PROCEDURE — 80307 DRUG TEST PRSMV CHEM ANLYZR: CPT | Performed by: EMERGENCY MEDICINE

## 2021-06-16 PROCEDURE — 99291 CRITICAL CARE FIRST HOUR: CPT | Mod: 25 | Performed by: EMERGENCY MEDICINE

## 2021-06-16 PROCEDURE — 90791 PSYCH DIAGNOSTIC EVALUATION: CPT

## 2021-06-16 ASSESSMENT — MIFFLIN-ST. JEOR: SCORE: 1365.04

## 2021-06-16 NOTE — DISCHARGE INSTRUCTIONS
Please keep your appointment to follow up with psychiatry tomorrow and keep your appointment for intake for day treatment (therapy) next week.  Please return the emergency department if you have suicidal thoughts that are more persistent or if you have plans to act on them.

## 2021-06-16 NOTE — ED PROVIDER NOTES
[facility] EMERGENCY DEPARTMENT ([facility])  6/16/21     History     Chief Complaint   Patient presents with     Agitation     States she is suicidal and homicidal.  States she was here 3 years ago but it did not help.  In and out of therapy but has not gone for a long time.  states she is confused and anxious.  Does not want to stay somewhere but wants to find out whawt to do .     Anxiety     States she had a panic attack on the way here.  and took a while to get here.  States she will hurt herself if she goes home.  Tearful in triage.  Here with her partner and support system.     The history is provided by the patient and medical records.     Rachel Danielle is a 26 year old female with a past medical history significant for anxiety and ADHD who presents here to the Emergency Department due to suicidal and homicidal ideation.  Patient reports that she has had these her whole life, however, over the last couple weeks its been more prominent.  She states that these thoughts about suicidal and homicidal ideation are in the moment, at times fleeting thoughts.  Sometimes, she thinks about driving her car into someone else in order to hurt herself and other people.  Sometimes she thinks about pinching or punching other people.  She does not have a specific plan.  Smokes marijuana, denies other drug use or alcohol use.  Denies any auditory or visual hallucinations.  She does not currently have any outpatient therapy or take any medications.  Otherwise denies current medical symptoms.  She does wonder why she has been having unprotected intercourse and has not been getting pregnant.    DEC  was consulted who provides additional history.  Patient has a history of severe panic attacks and anxiety.  She has a previous admission here in 2018 for mental health.  Two weeks after she was discharged she stopped her medication and therapy.  Patient has had progressively worsening anxiety throughout  "the pandemic.  She is now anxious about everything and having trouble going to work.  Patient is easily agitated.  She reports suicidal thoughts daily, though they are usually fleeting thoughts.  Patient denies suicidal ideation currently.  Patient came here today because she feels too overwhelmed to make appointments for herself.  She is interested in intensive outpatient treatment, she does not want to be admitted.    Past Medical History  Past Medical History:   Diagnosis Date     ADHD      Anxiety      Missed ab      Past Surgical History:   Procedure Laterality Date     DILATION AND CURETTAGE SUCTION WITH ULTRASOUND GUIDANCE N/A 3/12/2018    Procedure: DILATION AND CURETTAGE SUCTION WITH ULTRASOUND GUIDANCE;;  Surgeon: Lukas Tsai MD;  [facility]     LAPAROSCOPIC APPENDECTOMY N/A 8/8/2020    Procedure: APPENDECTOMY, LAPAROSCOPIC;  Surgeon: Lisa Gil MD;  Location:  OR     oxyCODONE (ROXICODONE) 5 MG tablet  SENNA-docusate sodium (SENNA S) 8.6-50 MG tablet      Allergies   Allergen Reactions     Tree Nuts [Nuts] Swelling     Throat and tongue swell up.     Family History  No family history on file.  Social History   Social History     Tobacco Use     Smoking status: Current Some Day Smoker     Smokeless tobacco: Never Used   Substance Use Topics     Alcohol use: Yes     Comment: 1 time per month     Drug use: Yes     Types: Marijuana     Comment: THC all day everyday.  Had some today.      Past medical history, past surgical history, medications, allergies, family history, and social history were reviewed with the patient. No additional pertinent items.       Review of Systems  A complete review of systems was performed with pertinent positives and negatives noted in the HPI, and all other systems negative.    Physical Exam   BP: (!) 164/83  Pulse: 110  Temp: 97.6  F (36.4  C)  Resp: 16  Height: 162.6 cm (5' 4\")  Weight: 63.5 kg (140 lb)  SpO2: 99 %  Physical Exam  GEN:  Well developed, no " acute distress  HEENT:  EOMI, Mucous membranes are moist.   Cardio:  RRR, no murmur, radial pulses equal bilaterally  PULM:  Lungs clear, good air movement, no wheezes, rales,   Abd:  Soft, normal bowel sounds, no focal tenderness  Musculoskeletal:  normal range of motion, no lower extremity swelling or calf tenderness  Neuro:  Alert and oriented X3, Follows commands, moving all extremities spontaneously   Skin:  Warm, dry   Psychiatric: Poor eye contact, depressed mood and affect, has intermittent suicidal and homicidal ideation, not premeditated homicidal ideation or towards anyone in particular, no auditory or visual hallucinations  ED Course      Procedures          DEC assessment was done.  DEC was able to set up a psychiatry appointment for the patient for tomorrow and patient has an intake appointment for day treatment next week.     No results found for any visits on 06/16/21.  Medications - No data to display     Assessments & Plan (with Medical Decision Making)   Patient presents with anxiety, intermittent suicidal thoughts, no specific plan.  She does not have any current outpatient treatment and is interested in intensive outpatient treatment.  She will see a psychiatrist tomorrow and have an intake appointment for day treatment next week.  Patient is encouraged to return if she has any more persistent suicidal thoughts, she develops a plan or if she has other concerns.    I have reviewed the nursing notes. I have reviewed the findings, diagnosis, plan and need for follow up with the patient.    New Prescriptions    No medications on file       Final diagnoses:   Anxiety   Suicidal thoughts       --  Melissa Landry MD  [facility] EMERGENCY DEPARTMENT  6/16/2021     Melissa Landry MD  06/16/21 1257

## 2021-06-16 NOTE — ED NOTES
Bed: ED16C  Expected date:   Expected time:   Means of arrival:   Comments:  Hold for floor cleaning

## 2021-06-22 ENCOUNTER — TELEPHONE (OUTPATIENT)
Dept: BEHAVIORAL HEALTH | Facility: CLINIC | Age: 26
End: 2021-06-22

## 2021-06-22 ENCOUNTER — HOSPITAL ENCOUNTER (OUTPATIENT)
Dept: BEHAVIORAL HEALTH | Facility: CLINIC | Age: 26
End: 2021-06-22
Attending: FAMILY MEDICINE
Payer: COMMERCIAL

## 2021-06-22 PROCEDURE — 999N000216 HC STATISTIC ADULT CD FACE TO FACE-NO CHRG: Mod: TEL | Performed by: COUNSELOR

## 2021-06-22 RX ORDER — HYDROXYZINE HYDROCHLORIDE 25 MG/1
10 TABLET, FILM COATED ORAL 3 TIMES DAILY PRN
COMMUNITY

## 2021-06-22 RX ORDER — LAMOTRIGINE 25 MG/1
25 TABLET ORAL DAILY
COMMUNITY

## 2021-06-22 ASSESSMENT — COLUMBIA-SUICIDE SEVERITY RATING SCALE - C-SSRS
TOTAL  NUMBER OF ABORTED OR SELF INTERRUPTED ATTEMPTS PAST LIFETIME: NO
ATTEMPT LIFETIME: NO
5. HAVE YOU STARTED TO WORK OUT OR WORKED OUT THE DETAILS OF HOW TO KILL YOURSELF? DO YOU INTEND TO CARRY OUT THIS PLAN?: NO
4. HAVE YOU HAD THESE THOUGHTS AND HAD SOME INTENTION OF ACTING ON THEM?: NO
TOTAL  NUMBER OF INTERRUPTED ATTEMPTS PAST 3 MONTHS: NO
TOTAL  NUMBER OF INTERRUPTED ATTEMPTS LIFETIME: NO
3. HAVE YOU BEEN THINKING ABOUT HOW YOU MIGHT KILL YOURSELF?: NO
2. HAVE YOU ACTUALLY HAD ANY THOUGHTS OF KILLING YOURSELF?: YES
ATTEMPT PAST THREE MONTHS: NO
1. IN THE PAST MONTH, HAVE YOU WISHED YOU WERE DEAD OR WISHED YOU COULD GO TO SLEEP AND NOT WAKE UP?: YES
1. IN THE PAST MONTH, HAVE YOU WISHED YOU WERE DEAD OR WISHED YOU COULD GO TO SLEEP AND NOT WAKE UP?: YES
4. HAVE YOU HAD THESE THOUGHTS AND HAD SOME INTENTION OF ACTING ON THEM?: NO
6. HAVE YOU EVER DONE ANYTHING, STARTED TO DO ANYTHING, OR PREPARED TO DO ANYTHING TO END YOUR LIFE?: NO
TOTAL  NUMBER OF ABORTED OR SELF INTERRUPTED ATTEMPTS PAST 3 MONTHS: 1
TOTAL  NUMBER OF ABORTED OR SELF INTERRUPTED ATTEMPTS PAST 3 MONTHS: YES
5. HAVE YOU STARTED TO WORK OUT OR WORKED OUT THE DETAILS OF HOW TO KILL YOURSELF? DO YOU INTEND TO CARRY OUT THIS PLAN?: NO
2. HAVE YOU ACTUALLY HAD ANY THOUGHTS OF KILLING YOURSELF LIFETIME?: YES
6. HAVE YOU EVER DONE ANYTHING, STARTED TO DO ANYTHING, OR PREPARED TO DO ANYTHING TO END YOUR LIFE?: NO

## 2021-06-22 ASSESSMENT — ANXIETY QUESTIONNAIRES
3. WORRYING TOO MUCH ABOUT DIFFERENT THINGS: NEARLY EVERY DAY
6. BECOMING EASILY ANNOYED OR IRRITABLE: SEVERAL DAYS
5. BEING SO RESTLESS THAT IT IS HARD TO SIT STILL: SEVERAL DAYS
2. NOT BEING ABLE TO STOP OR CONTROL WORRYING: NEARLY EVERY DAY
GAD7 TOTAL SCORE: 13
1. FEELING NERVOUS, ANXIOUS, OR ON EDGE: NEARLY EVERY DAY
IF YOU CHECKED OFF ANY PROBLEMS ON THIS QUESTIONNAIRE, HOW DIFFICULT HAVE THESE PROBLEMS MADE IT FOR YOU TO DO YOUR WORK, TAKE CARE OF THINGS AT HOME, OR GET ALONG WITH OTHER PEOPLE: VERY DIFFICULT
7. FEELING AFRAID AS IF SOMETHING AWFUL MIGHT HAPPEN: SEVERAL DAYS

## 2021-06-22 ASSESSMENT — PATIENT HEALTH QUESTIONNAIRE - PHQ9
SUM OF ALL RESPONSES TO PHQ QUESTIONS 1-9: 16
5. POOR APPETITE OR OVEREATING: SEVERAL DAYS

## 2021-06-22 NOTE — TELEPHONE ENCOUNTER
Tried reaching out to patient to check them in for their virtual MH eval today, 6/22/2021 at 0800. Called, but no answer so a voice message was left for patient to return call to check in.

## 2021-06-22 NOTE — PROGRESS NOTES
"Email Communication sent on this date:    Good morning!  It was a pleasure meeting with you this morning.  Attached are the DBT resources we discussed on this date.  Please let me know if you need additional resources.  I have also attached the Safety/Coping Plan per our discussion as well.  Please contact me or the Assessment Center should your needs change or are interested in our outpatient programs.    DBT Resources    [facility] in [place] [address]  [place] [address]                           [phone]    [facility]  [place], [place]   [phone]    [facility]  Adult Groups: [phone]    Offered at all [facility] locations - times and days vary    Expected to commit to the [facility] program    Weekly group meeting which lasts for 2- 2.5 hours    Participate in weekly individual therapy    SAFETY PLAN:     Step 1: Warning signs / cues (Thoughts, images, mood, situation, behavior) that a crisis may be developing:     ? Thoughts: \"I don't matter\", \"People would be better off without me\", \"I can't do this anymore\" and \"I just want this to end\"  ? Thinking Processes: racing thoughts  ? Mood: intense worry  ? Situations: Extreme anxiety   ?    Step 2: Coping strategies - Things I can do to take my mind off of my problems without contacting another person (relaxation technique, physical activity):     ? Distress Tolerance Strategies:  being with dogs, being home, and being alone  ? Physical Activities: go for a walk, yoga and deep breathing  ? Focus on helpful thoughts:  \"This is temporary\" and \"I will get through this\"     Step 3: People and social settings that provide distraction:                 Name: Roommate       Phone: In phone                Step 4: Remind myself of people and things that are important to me and worth living for:  \"my dogs\"     Step 5: When I am in crisis, I can ask these people to help me use my safety plan:                 Name: Roommate  "      Phone: In phone        Step 6: Making the environment safe:      ? be around others     Step 7: Professionals or agencies I can contact during a crisis:     ? Suicide Prevention Lifeline: 6-046-940-TALK (1561)  ? Crisis Text Line Service (available 24 hours a day, 7 days a week): Text [place] to 005687  ? Call  **CRISIS (888642) from a cell phone to talk to a team of professionals who can help you.          Crisis Services By County: Phone Number:   [place]     [phone]   [place]    [phone]   [place]    [phone]   [place]    [phone]   [place]    [phone]   [place] [phone]   [place]     [phone]      ? Call 911 or go to my nearest emergency department.             I helped develop this safety plan and agree to use it when needed.  I have been given a copy of this plan.          Today s date:  6/22/2021  Adapted from Safety Plan Template 2008 Sindhu Amato and Jackson Villa is reprinted with the express permission of the authors.  No portion of the Safety Plan Template may be reproduced without the express, written permission.  You can contact the authors at [email or web address].[email or web address] or [email or web address]         Thank you,  Snow Moran [facility] Psychotherapist   [facility]  Mental Health [facility] Addiction [facility] Assessment [facility]  [address] [place] [address]  [address]  [place] [address]  [email or web address]  [email or web address]   Office: [phone]  Fax: [phone]  Gender pronouns: she/her
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
## 2021-06-23 ASSESSMENT — ANXIETY QUESTIONNAIRES: GAD7 TOTAL SCORE: 13

## 2024-11-14 ENCOUNTER — HOSPITAL ENCOUNTER (EMERGENCY)
Facility: CLINIC | Age: 29
Discharge: HOME | End: 2024-11-14
Attending: EMERGENCY MEDICINE | Admitting: EMERGENCY MEDICINE

## 2024-11-14 ENCOUNTER — APPOINTMENT (OUTPATIENT)
Dept: GENERAL RADIOLOGY | Facility: CLINIC | Age: 29
End: 2024-11-14
Attending: EMERGENCY MEDICINE

## 2024-11-14 VITALS
OXYGEN SATURATION: 97 % | TEMPERATURE: 99 F | RESPIRATION RATE: 16 BRPM | HEART RATE: 82 BPM | BODY MASS INDEX: 20.49 KG/M2 | SYSTOLIC BLOOD PRESSURE: 113 MMHG | DIASTOLIC BLOOD PRESSURE: 81 MMHG | WEIGHT: 120 LBS | HEIGHT: 64 IN

## 2024-11-14 DIAGNOSIS — R09.1 PLEURISY: ICD-10-CM

## 2024-11-14 LAB
ANION GAP SERPL CALCULATED.3IONS-SCNC: 13 MMOL/L (ref 7–15)
ATRIAL RATE - MUSE: 87 BPM
BASOPHILS # BLD AUTO: 0.1 10E3/UL (ref 0–0.2)
BASOPHILS NFR BLD AUTO: 1 %
BUN SERPL-MCNC: 10.2 MG/DL (ref 6–20)
CALCIUM SERPL-MCNC: 10 MG/DL (ref 8.8–10.4)
CHLORIDE SERPL-SCNC: 104 MMOL/L (ref 98–107)
CREAT SERPL-MCNC: 0.85 MG/DL (ref 0.51–0.95)
CRP SERPL-MCNC: <3 MG/L
D DIMER PPP FEU-MCNC: <0.27 UG/ML FEU (ref 0–0.5)
DIASTOLIC BLOOD PRESSURE - MUSE: NORMAL MMHG
EGFRCR SERPLBLD CKD-EPI 2021: >90 ML/MIN/1.73M2
EOSINOPHIL # BLD AUTO: 0.4 10E3/UL (ref 0–0.7)
EOSINOPHIL NFR BLD AUTO: 3 %
ERYTHROCYTE [DISTWIDTH] IN BLOOD BY AUTOMATED COUNT: 12.6 % (ref 10–15)
ERYTHROCYTE [SEDIMENTATION RATE] IN BLOOD BY WESTERGREN METHOD: 9 MM/HR (ref 0–20)
FLUAV RNA SPEC QL NAA+PROBE: NEGATIVE
FLUBV RNA RESP QL NAA+PROBE: NEGATIVE
GLUCOSE SERPL-MCNC: 99 MG/DL (ref 70–99)
HCO3 SERPL-SCNC: 23 MMOL/L (ref 22–29)
HCT VFR BLD AUTO: 39.4 % (ref 35–47)
HGB BLD-MCNC: 13.3 G/DL (ref 11.7–15.7)
HOLD SPECIMEN: NORMAL
IMM GRANULOCYTES # BLD: 0 10E3/UL
IMM GRANULOCYTES NFR BLD: 0 %
INTERPRETATION ECG - MUSE: NORMAL
LYMPHOCYTES # BLD AUTO: 3.4 10E3/UL (ref 0.8–5.3)
LYMPHOCYTES NFR BLD AUTO: 27 %
MCH RBC QN AUTO: 31.6 PG (ref 26.5–33)
MCHC RBC AUTO-ENTMCNC: 33.8 G/DL (ref 31.5–36.5)
MCV RBC AUTO: 94 FL (ref 78–100)
MONOCYTES # BLD AUTO: 1 10E3/UL (ref 0–1.3)
MONOCYTES NFR BLD AUTO: 8 %
NEUTROPHILS # BLD AUTO: 7.6 10E3/UL (ref 1.6–8.3)
NEUTROPHILS NFR BLD AUTO: 61 %
NRBC # BLD AUTO: 0 10E3/UL
NRBC BLD AUTO-RTO: 0 /100
P AXIS - MUSE: 78 DEGREES
PLATELET # BLD AUTO: 366 10E3/UL (ref 150–450)
POTASSIUM SERPL-SCNC: 3.7 MMOL/L (ref 3.4–5.3)
PR INTERVAL - MUSE: 154 MS
QRS DURATION - MUSE: 74 MS
QT - MUSE: 344 MS
QTC - MUSE: 413 MS
R AXIS - MUSE: 38 DEGREES
RBC # BLD AUTO: 4.21 10E6/UL (ref 3.8–5.2)
RSV RNA SPEC NAA+PROBE: NEGATIVE
SARS-COV-2 RNA RESP QL NAA+PROBE: NEGATIVE
SODIUM SERPL-SCNC: 140 MMOL/L (ref 135–145)
SYSTOLIC BLOOD PRESSURE - MUSE: NORMAL MMHG
T AXIS - MUSE: 50 DEGREES
TROPONIN T SERPL HS-MCNC: <6 NG/L
VENTRICULAR RATE- MUSE: 87 BPM
WBC # BLD AUTO: 12.5 10E3/UL (ref 4–11)

## 2024-11-14 PROCEDURE — 87637 SARSCOV2&INF A&B&RSV AMP PRB: CPT | Performed by: EMERGENCY MEDICINE

## 2024-11-14 PROCEDURE — 36415 COLL VENOUS BLD VENIPUNCTURE: CPT | Performed by: EMERGENCY MEDICINE

## 2024-11-14 PROCEDURE — 93005 ELECTROCARDIOGRAM TRACING: CPT

## 2024-11-14 PROCEDURE — 71046 X-RAY EXAM CHEST 2 VIEWS: CPT

## 2024-11-14 PROCEDURE — 84132 ASSAY OF SERUM POTASSIUM: CPT | Performed by: EMERGENCY MEDICINE

## 2024-11-14 PROCEDURE — 84484 ASSAY OF TROPONIN QUANT: CPT | Performed by: EMERGENCY MEDICINE

## 2024-11-14 PROCEDURE — 99285 EMERGENCY DEPT VISIT HI MDM: CPT | Mod: 25

## 2024-11-14 PROCEDURE — 96376 TX/PRO/DX INJ SAME DRUG ADON: CPT

## 2024-11-14 PROCEDURE — 85025 COMPLETE CBC W/AUTO DIFF WBC: CPT | Performed by: EMERGENCY MEDICINE

## 2024-11-14 PROCEDURE — 250N000011 HC RX IP 250 OP 636: Performed by: EMERGENCY MEDICINE

## 2024-11-14 PROCEDURE — 86140 C-REACTIVE PROTEIN: CPT | Performed by: EMERGENCY MEDICINE

## 2024-11-14 PROCEDURE — 85018 HEMOGLOBIN: CPT | Performed by: EMERGENCY MEDICINE

## 2024-11-14 PROCEDURE — 85379 FIBRIN DEGRADATION QUANT: CPT | Performed by: EMERGENCY MEDICINE

## 2024-11-14 PROCEDURE — 96374 THER/PROPH/DIAG INJ IV PUSH: CPT

## 2024-11-14 PROCEDURE — 85652 RBC SED RATE AUTOMATED: CPT | Performed by: EMERGENCY MEDICINE

## 2024-11-14 PROCEDURE — 80048 BASIC METABOLIC PNL TOTAL CA: CPT | Performed by: EMERGENCY MEDICINE

## 2024-11-14 RX ORDER — KETOROLAC TROMETHAMINE 15 MG/ML
15 INJECTION, SOLUTION INTRAMUSCULAR; INTRAVENOUS ONCE
Status: COMPLETED | OUTPATIENT
Start: 2024-11-14 | End: 2024-11-14

## 2024-11-14 RX ORDER — IBUPROFEN 800 MG/1
800 TABLET, FILM COATED ORAL EVERY 8 HOURS PRN
Qty: 60 TABLET | Refills: 0 | Status: SHIPPED | OUTPATIENT
Start: 2024-11-14 | End: 2024-12-04

## 2024-11-14 RX ORDER — PREDNISONE 20 MG/1
TABLET ORAL
Qty: 10 TABLET | Refills: 0 | Status: SHIPPED | OUTPATIENT
Start: 2024-11-14

## 2024-11-14 RX ADMIN — KETOROLAC TROMETHAMINE 15 MG: 15 INJECTION, SOLUTION INTRAMUSCULAR; INTRAVENOUS at 05:21

## 2024-11-14 RX ADMIN — KETOROLAC TROMETHAMINE 15 MG: 15 INJECTION, SOLUTION INTRAMUSCULAR; INTRAVENOUS at 03:43

## 2024-11-14 ASSESSMENT — ACTIVITIES OF DAILY LIVING (ADL)
ADLS_ACUITY_SCORE: 0

## 2024-11-14 NOTE — ED TRIAGE NOTES
Patient arrives with sharp left chest pain. Has had it for 2 days mildly but tonight sharp pain woke up out of sleep. Holding left chest/armpit area. Feels short of breath     Triage Assessment (Adult)       Row Name 11/14/24 0305          Triage Assessment    Airway WDL WDL        Respiratory WDL    Respiratory WDL X  sob        Skin Circulation/Temperature WDL    Skin Circulation/Temperature WDL WDL        Cardiac WDL    Cardiac WDL X;all        Chest Pain Assessment    Chest Pain Location anterior chest, left     Character sharp        Peripheral/Neurovascular WDL    Peripheral Neurovascular WDL WDL        Cognitive/Neuro/Behavioral WDL    Cognitive/Neuro/Behavioral WDL WDL

## 2024-11-14 NOTE — ED PROVIDER NOTES
"  Emergency Department Note      History of Present Illness     Chief Complaint   Chest Pain      HPI   Rachel Danielle is a 29 year old female who presents to the emergency department with a left-sided sharp chest pain that began 2 days ago.  Initially was mild across the left side of the chest but tonight became sharp and stabbing.  Worse with movement and deep breathing.  Denies any fever or cough.  Denies any nausea or vomiting.  Denies any pain or swelling the lower extremities.  She has a strong family history of coronary artery disease and reports that her father  at the age of 43.    Past Medical History     Medical History and Problem List   Past Medical History:   Diagnosis Date    ADHD     Anxiety     Missed ab        Medications   ibuprofen (ADVIL/MOTRIN) 800 MG tablet  predniSONE (DELTASONE) 20 MG tablet  hydrOXYzine (ATARAX) 25 MG tablet  lamoTRIgine (LAMICTAL) 25 MG tablet  oxyCODONE (ROXICODONE) 5 MG tablet  SENNA-docusate sodium (SENNA S) 8.6-50 MG tablet        Surgical History   Past Surgical History:   Procedure Laterality Date    DILATION AND CURETTAGE SUCTION WITH ULTRASOUND GUIDANCE N/A 3/12/2018    Procedure: DILATION AND CURETTAGE SUCTION WITH ULTRASOUND GUIDANCE;;  Surgeon: Lukas Tsai MD;  Location: [facility]    LAPAROSCOPIC APPENDECTOMY N/A 2020    Procedure: APPENDECTOMY, LAPAROSCOPIC;  Surgeon: Lisa Gil MD;  Location:  OR       Physical Exam     Patient Vitals for the past 24 hrs:   BP Temp Temp src Pulse Resp SpO2 Height Weight   24 0520 113/81 -- -- 82 -- 97 % -- --   24 0430 116/83 -- -- 86 16 96 % -- --   24 0415 125/87 -- -- 75 -- 98 % -- --   24 0400 128/88 -- -- 85 21 98 % -- --   24 0330 113/84 -- -- 81 18 99 % -- --   24 0315 (!) 125/98 -- -- 96 14 91 % -- --   24 0303 (!) 129/101 99  F (37.2  C) Temporal (!) 127 20 96 % 1.626 m (5' 4\") 54.4 kg (120 lb)     Physical Exam  General: Patient is awake, alert and " interactive  CV: Regular rate and rhythm. Peripheral pulses including radial pulses are symmetric.   Resp: Lungs are clear without wheezes or rales. No respiratory distress.    MS: Chest wall is tender to palpation. No asymmetric leg swelling, calf or thigh tenderness.    Skin: No rash or lesions noted. Normal capillary refill noted  Neuro: Speech is normal and fluent. Face is symmetric. Moving all extremities.   Psych:  Normal affect.  Appropriate interactions.     Diagnostics     Lab Results   Labs Ordered and Resulted from Time of ED Arrival to Time of ED Departure   CBC WITH PLATELETS AND DIFFERENTIAL - Abnormal       Result Value    WBC Count 12.5 (*)     RBC Count 4.21      Hemoglobin 13.3      Hematocrit 39.4      MCV 94      MCH 31.6      MCHC 33.8      RDW 12.6      Platelet Count 366      % Neutrophils 61      % Lymphocytes 27      % Monocytes 8      % Eosinophils 3      % Basophils 1      % Immature Granulocytes 0      NRBCs per 100 WBC 0      Absolute Neutrophils 7.6      Absolute Lymphocytes 3.4      Absolute Monocytes 1.0      Absolute Eosinophils 0.4      Absolute Basophils 0.1      Absolute Immature Granulocytes 0.0      Absolute NRBCs 0.0     D DIMER QUANTITATIVE - Normal    D-Dimer Quantitative <0.27     BASIC METABOLIC PANEL - Normal    Sodium 140      Potassium 3.7      Chloride 104      Carbon Dioxide (CO2) 23      Anion Gap 13      Urea Nitrogen 10.2      Creatinine 0.85      GFR Estimate >90      Calcium 10.0      Glucose 99     TROPONIN T, HIGH SENSITIVITY - Normal    Troponin T, High Sensitivity <6     CRP INFLAMMATION - Normal    CRP Inflammation <3.00     ERYTHROCYTE SEDIMENTATION RATE AUTO - Normal    Erythrocyte Sedimentation Rate 9     INFLUENZA A/B, RSV AND SARS-COV2 PCR - Normal    Influenza A PCR Negative      Influenza B PCR Negative      RSV PCR Negative      SARS CoV2 PCR Negative         Imaging   XR Chest 2 Views   Final Result   IMPRESSION: Negative chest.          EKG   ECG  results from 11/14/24   EKG 12-lead, tracing only     Value    Systolic Blood Pressure     Diastolic Blood Pressure     Ventricular Rate 87    Atrial Rate 87    MT Interval 154    QRS Duration 74        QTc 413    P Axis 78    R AXIS 38    T Axis 50    Interpretation ECG      Sinus rhythm  Low voltage QRS  Borderline ECG  No previous ECGs available          Independent Interpretation   CXR is negative for PNA    ED Course      Medications Administered   Medications   ketorolac (TORADOL) injection 15 mg (15 mg Intravenous $Given 11/14/24 0343)   ketorolac (TORADOL) injection 15 mg (15 mg Intravenous $Given 11/14/24 0521)         Medical Decision Making / Diagnosis     SONIA Danielle is a 29 year old female who presents for evaluation of chest pain.  This seems likely related to pleuritis given the positional character and pain that increases with increased deep breath as well as response to NSAIDS. DDimer negative, less likely pulmonary embolism.  Consideration given to pericarditis however with negative inflammatory markers and negative troponin this seems much less likely.  Chest x-ray did not show any signs of pneumonia, pleural effusion, pulmonary edema or wide mediastinum.  The work up in the Emergency Department is negative.  I considered a broad differential diagnosis for the patient's chest pain including life threatening etiologies such as acute coronary syndrome, myocardial infarction, pulmonary embolism, acute aortic dissection, myocarditis, pericarditis, acute valvular insufficiency amongst others.  Other causes considered included pneumonia, pneumothorax, chest wall source, pericarditis, pleurisy, esophageal spasm, etc. No serious etiology for the chest pain were detected today during this visit.  Close follow up with primary care is indicated should the pain continue, as further work up may be performed; this was made clear to the patient, who understands.      Disposition   The  patient was discharged.     Diagnosis     ICD-10-CM    1. Pleurisy  R09.1            Discharge Medications   Discharge Medication List as of 11/14/2024  5:31 AM        START taking these medications    Details   ibuprofen (ADVIL/MOTRIN) 800 MG tablet Take 1 tablet (800 mg) by mouth every 8 hours as needed for inflammatory pain., Disp-60 tablet, R-0, Local Print      predniSONE (DELTASONE) 20 MG tablet Take two tablets (= 40mg) each day for 5 (five) days, Disp-10 tablet, R-0, Local Print             MD Neo Miller, Daniel Velarde MD  11/14/24 0610

## 2024-12-12 ENCOUNTER — APPOINTMENT (OUTPATIENT)
Dept: GENERAL RADIOLOGY | Facility: CLINIC | Age: 29
End: 2024-12-12
Attending: EMERGENCY MEDICINE
Payer: COMMERCIAL

## 2024-12-12 ENCOUNTER — APPOINTMENT (OUTPATIENT)
Dept: ULTRASOUND IMAGING | Facility: CLINIC | Age: 29
End: 2024-12-12
Attending: EMERGENCY MEDICINE
Payer: COMMERCIAL

## 2024-12-12 ENCOUNTER — HOSPITAL ENCOUNTER (EMERGENCY)
Facility: CLINIC | Age: 29
Discharge: HOME | End: 2024-12-12
Attending: EMERGENCY MEDICINE
Payer: COMMERCIAL

## 2024-12-12 VITALS
HEART RATE: 85 BPM | WEIGHT: 115 LBS | TEMPERATURE: 99 F | BODY MASS INDEX: 19.63 KG/M2 | OXYGEN SATURATION: 99 % | DIASTOLIC BLOOD PRESSURE: 85 MMHG | HEIGHT: 64 IN | SYSTOLIC BLOOD PRESSURE: 123 MMHG | RESPIRATION RATE: 18 BRPM

## 2024-12-12 DIAGNOSIS — N64.4 BREAST PAIN, LEFT: ICD-10-CM

## 2024-12-12 DIAGNOSIS — N63.22 MASS OF UPPER INNER QUADRANT OF LEFT BREAST: Primary | ICD-10-CM

## 2024-12-12 DIAGNOSIS — R07.81 PLEURITIC CHEST PAIN: ICD-10-CM

## 2024-12-12 LAB
ANION GAP SERPL CALCULATED.3IONS-SCNC: 11 MMOL/L (ref 7–15)
ATRIAL RATE - MUSE: 94 BPM
BASOPHILS # BLD AUTO: 0.1 10E3/UL (ref 0–0.2)
BASOPHILS NFR BLD AUTO: 1 %
BUN SERPL-MCNC: 15.2 MG/DL (ref 6–20)
CALCIUM SERPL-MCNC: 9.3 MG/DL (ref 8.8–10.4)
CHLORIDE SERPL-SCNC: 104 MMOL/L (ref 98–107)
CREAT SERPL-MCNC: 0.76 MG/DL (ref 0.51–0.95)
D DIMER PPP FEU-MCNC: <0.27 UG/ML FEU (ref 0–0.5)
DIASTOLIC BLOOD PRESSURE - MUSE: NORMAL MMHG
EGFRCR SERPLBLD CKD-EPI 2021: >90 ML/MIN/1.73M2
EOSINOPHIL # BLD AUTO: 0.5 10E3/UL (ref 0–0.7)
EOSINOPHIL NFR BLD AUTO: 4 %
ERYTHROCYTE [DISTWIDTH] IN BLOOD BY AUTOMATED COUNT: 12.6 % (ref 10–15)
GLUCOSE SERPL-MCNC: 91 MG/DL (ref 70–99)
HCG SERPL QL: NEGATIVE
HCO3 SERPL-SCNC: 24 MMOL/L (ref 22–29)
HCT VFR BLD AUTO: 40.7 % (ref 35–47)
HGB BLD-MCNC: 14 G/DL (ref 11.7–15.7)
IMM GRANULOCYTES # BLD: 0 10E3/UL
IMM GRANULOCYTES NFR BLD: 0 %
INTERPRETATION ECG - MUSE: NORMAL
LYMPHOCYTES # BLD AUTO: 3.1 10E3/UL (ref 0.8–5.3)
LYMPHOCYTES NFR BLD AUTO: 29 %
MCH RBC QN AUTO: 32 PG (ref 26.5–33)
MCHC RBC AUTO-ENTMCNC: 34.4 G/DL (ref 31.5–36.5)
MCV RBC AUTO: 93 FL (ref 78–100)
MONOCYTES # BLD AUTO: 0.7 10E3/UL (ref 0–1.3)
MONOCYTES NFR BLD AUTO: 7 %
NEUTROPHILS # BLD AUTO: 6.1 10E3/UL (ref 1.6–8.3)
NEUTROPHILS NFR BLD AUTO: 59 %
NRBC # BLD AUTO: 0 10E3/UL
NRBC BLD AUTO-RTO: 0 /100
P AXIS - MUSE: 77 DEGREES
PLATELET # BLD AUTO: 429 10E3/UL (ref 150–450)
POTASSIUM SERPL-SCNC: 3.5 MMOL/L (ref 3.4–5.3)
PR INTERVAL - MUSE: 132 MS
QRS DURATION - MUSE: 70 MS
QT - MUSE: 332 MS
QTC - MUSE: 415 MS
R AXIS - MUSE: 53 DEGREES
RBC # BLD AUTO: 4.38 10E6/UL (ref 3.8–5.2)
SODIUM SERPL-SCNC: 139 MMOL/L (ref 135–145)
SYSTOLIC BLOOD PRESSURE - MUSE: NORMAL MMHG
T AXIS - MUSE: 55 DEGREES
TROPONIN T SERPL HS-MCNC: <6 NG/L
VENTRICULAR RATE- MUSE: 94 BPM
WBC # BLD AUTO: 10.5 10E3/UL (ref 4–11)

## 2024-12-12 PROCEDURE — 85379 FIBRIN DEGRADATION QUANT: CPT | Performed by: EMERGENCY MEDICINE

## 2024-12-12 PROCEDURE — 85048 AUTOMATED LEUKOCYTE COUNT: CPT | Performed by: EMERGENCY MEDICINE

## 2024-12-12 PROCEDURE — 99285 EMERGENCY DEPT VISIT HI MDM: CPT | Mod: 25

## 2024-12-12 PROCEDURE — 84484 ASSAY OF TROPONIN QUANT: CPT | Performed by: EMERGENCY MEDICINE

## 2024-12-12 PROCEDURE — 96374 THER/PROPH/DIAG INJ IV PUSH: CPT

## 2024-12-12 PROCEDURE — 85004 AUTOMATED DIFF WBC COUNT: CPT | Performed by: EMERGENCY MEDICINE

## 2024-12-12 PROCEDURE — 250N000013 HC RX MED GY IP 250 OP 250 PS 637: Performed by: EMERGENCY MEDICINE

## 2024-12-12 PROCEDURE — 80048 BASIC METABOLIC PNL TOTAL CA: CPT | Performed by: EMERGENCY MEDICINE

## 2024-12-12 PROCEDURE — 36415 COLL VENOUS BLD VENIPUNCTURE: CPT | Performed by: EMERGENCY MEDICINE

## 2024-12-12 PROCEDURE — 71046 X-RAY EXAM CHEST 2 VIEWS: CPT

## 2024-12-12 PROCEDURE — 84703 CHORIONIC GONADOTROPIN ASSAY: CPT | Performed by: EMERGENCY MEDICINE

## 2024-12-12 PROCEDURE — 76642 ULTRASOUND BREAST LIMITED: CPT | Mod: LT

## 2024-12-12 PROCEDURE — 93005 ELECTROCARDIOGRAM TRACING: CPT

## 2024-12-12 PROCEDURE — 250N000011 HC RX IP 250 OP 636: Performed by: EMERGENCY MEDICINE

## 2024-12-12 RX ORDER — ACETAMINOPHEN 325 MG/1
975 TABLET ORAL ONCE
Status: COMPLETED | OUTPATIENT
Start: 2024-12-12 | End: 2024-12-12

## 2024-12-12 RX ORDER — OXYCODONE HYDROCHLORIDE 5 MG/1
5 TABLET ORAL ONCE
Status: COMPLETED | OUTPATIENT
Start: 2024-12-12 | End: 2024-12-12

## 2024-12-12 RX ORDER — OXYCODONE HYDROCHLORIDE 5 MG/1
5 TABLET ORAL EVERY 6 HOURS PRN
Qty: 18 TABLET | Refills: 0 | Status: SHIPPED | OUTPATIENT
Start: 2024-12-12

## 2024-12-12 RX ORDER — KETOROLAC TROMETHAMINE 15 MG/ML
15 INJECTION, SOLUTION INTRAMUSCULAR; INTRAVENOUS ONCE
Status: COMPLETED | OUTPATIENT
Start: 2024-12-12 | End: 2024-12-12

## 2024-12-12 RX ADMIN — OXYCODONE HYDROCHLORIDE 5 MG: 5 TABLET ORAL at 22:56

## 2024-12-12 RX ADMIN — ACETAMINOPHEN 975 MG: 325 TABLET, FILM COATED ORAL at 18:43

## 2024-12-12 RX ADMIN — KETOROLAC TROMETHAMINE 15 MG: 15 INJECTION, SOLUTION INTRAMUSCULAR; INTRAVENOUS at 18:43

## 2024-12-12 ASSESSMENT — COLUMBIA-SUICIDE SEVERITY RATING SCALE - C-SSRS
5. HAVE YOU STARTED TO WORK OUT OR WORKED OUT THE DETAILS OF HOW TO KILL YOURSELF? DO YOU INTEND TO CARRY OUT THIS PLAN?: NO
4. HAVE YOU HAD THESE THOUGHTS AND HAD SOME INTENTION OF ACTING ON THEM?: NO
2. HAVE YOU ACTUALLY HAD ANY THOUGHTS OF KILLING YOURSELF IN THE PAST MONTH?: YES
6. HAVE YOU EVER DONE ANYTHING, STARTED TO DO ANYTHING, OR PREPARED TO DO ANYTHING TO END YOUR LIFE?: NO
1. IN THE PAST MONTH, HAVE YOU WISHED YOU WERE DEAD OR WISHED YOU COULD GO TO SLEEP AND NOT WAKE UP?: YES
3. HAVE YOU BEEN THINKING ABOUT HOW YOU MIGHT KILL YOURSELF?: NO

## 2024-12-12 ASSESSMENT — ACTIVITIES OF DAILY LIVING (ADL)
ADLS_ACUITY_SCORE: 48

## 2024-12-12 NOTE — ED TRIAGE NOTES
Patient seen a few weeks ago and diagnosed with pleurisy. Patient still c/o left sided chest that radiates to left shoulder and back. Patient also noticed a lump on left breast approx 10 days ago.  Patient states she is still having nite sweats and mild cough.

## 2024-12-13 ENCOUNTER — HOSPITAL ENCOUNTER (OUTPATIENT)
Dept: MAMMOGRAPHY | Facility: CLINIC | Age: 29
Discharge: HOME | End: 2024-12-13
Attending: RADIOLOGY
Payer: COMMERCIAL

## 2024-12-13 DIAGNOSIS — N63.20 MASS OF LEFT BREAST: ICD-10-CM

## 2024-12-13 LAB
ATRIAL RATE - MUSE: 94 BPM
DIASTOLIC BLOOD PRESSURE - MUSE: NORMAL MMHG
INTERPRETATION ECG - MUSE: NORMAL
P AXIS - MUSE: 77 DEGREES
PR INTERVAL - MUSE: 132 MS
QRS DURATION - MUSE: 70 MS
QT - MUSE: 332 MS
QTC - MUSE: 415 MS
R AXIS - MUSE: 53 DEGREES
SYSTOLIC BLOOD PRESSURE - MUSE: NORMAL MMHG
T AXIS - MUSE: 55 DEGREES
VENTRICULAR RATE- MUSE: 94 BPM

## 2024-12-13 PROCEDURE — 250N000009 HC RX 250: Performed by: RADIOLOGY

## 2024-12-13 PROCEDURE — 272N000615 US BREAST BIOPSY CORE NEEDLE LEFT

## 2024-12-13 PROCEDURE — 88305 TISSUE EXAM BY PATHOLOGIST: CPT | Mod: TC | Performed by: RADIOLOGY

## 2024-12-13 PROCEDURE — A4648 IMPLANTABLE TISSUE MARKER: HCPCS

## 2024-12-13 PROCEDURE — 999N000065 MA POST PROCEDURE LEFT

## 2024-12-13 RX ADMIN — LIDOCAINE HYDROCHLORIDE 5 ML: 10 INJECTION, SOLUTION INFILTRATION; PERINEURAL at 10:34

## 2024-12-13 NOTE — DISCHARGE INSTRUCTIONS
Please follow-up with your primary care provider and/or specialist regarding your visit to the ER today.    Please return to the emergency department should you experience any of the symptoms we specifically discussed, including but not limited to recurrence or worsening of your symptoms, or development of any new and concerning symptoms such as worsening pain, shortness of breath, fever, worsening pain, breast swelling/redness, or nipple drainage.    Please take medication as instructed on bottle.

## 2024-12-13 NOTE — ED PROVIDER NOTES
"  Emergency Department Note      History of Present Illness     Chief Complaint   Chest Pain and Cough      HPI   Rachel Danielle is a 29 year old female with history of anxiety who presents for evaluation of chest pain and cough. Patient reports that for the past month she has been experiencing left sided chest pain that was diagnosed in the ED as pleurisy. Around 10 days ago she then noticed an ovular nodule under the skin of her left breast connecting to a vein around the area. The lump is not red or swollen, and is generally not visible. Then this morning she began experiencing extreme sharp pains around every 30 minutes radiating from the left breast to the armpit to the left shoulder. She notes that caffeine has been worsening her chest pain and she has been experiencing a loss of appetite over the last 2 weeks as well. Patient also endorses diaphoresis at night and a mild cough. She denies experiencing any fever, shortness of breath, rhinorrhea, vomiting, abdominal pain, or nipple discharge. She has no history of breast cancer and denies chance of pregnancy. Patient has not been screened for breast cancer by her OB in the past. She denies a history of tobacco use.       Independent Historian   None    Review of External Notes   Reviewed 11/4/24 ED visit note and 8/9/20 discharge summary    Past Medical History     Medical History and Problem List   ADHD  Anxiety  Suicidal ideation  Appendicitis      Medications   Senna  Prednisone  Oxycodone  Lamictal  Atarax      Surgical History   Dilation and curettage suction with ultrasound guidance  Laparoscopic appendectomy       Physical Exam     Patient Vitals for the past 24 hrs:   BP Temp Temp src Pulse Resp SpO2 Height Weight   12/12/24 2209 104/89 -- -- 87 -- 97 % -- --   12/12/24 1540 (!) 150/95 99  F (37.2  C) Tympanic 102 21 100 % 1.626 m (5' 4\") 52.2 kg (115 lb)     Physical Exam  Constitutional:       General: Not in acute distress.     " Appearance: Normal appearance.   HENT:      Head: Normocephalic and atraumatic.   Eyes:      Extraocular Movements: Extraocular movements intact.      Conjunctiva/sclera: Conjunctivae normal.   Cardiovascular:      Rate and Rhythm: Regular rate and rhythm.  Pulmonary:      Effort: Pulmonary effort is normal. No respiratory distress.      Breath sounds: Normal breath sounds.  Abdominal:      General: Abdomen is flat. There is no distension.      Palpations: Abdomen is soft.      Tenderness: There is no abdominal tenderness.   Musculoskeletal:      Left breast (female ED tech chaperoned): No overlying skin changes, erythema, or swelling.  No nipple drainage/discharge.  Fibrocystic changes to the breast tissue.  Tender nodular mass roughly Dequan size in the 11:00 positioning of the mid/right upper quadrant breast.     Cervical back: Normal range of motion. No rigidity.      Right lower leg: No edema.      Left lower leg: No edema.   Skin:     General: Skin is warm and dry.   Neurological:      General: No focal deficit present.      Mental Status: Alert and oriented to person, place, and time.   Psychiatric:         Mood and Affect: Mood normal.         Behavior: Behavior normal.      Diagnostics     Lab Results   Labs Ordered and Resulted from Time of ED Arrival to Time of ED Departure   D DIMER QUANTITATIVE - Normal       Result Value    D-Dimer Quantitative <0.27     HCG QUALITATIVE PREGNANCY - Normal    hCG Serum Qualitative Negative     BASIC METABOLIC PANEL - Normal    Sodium 139      Potassium 3.5      Chloride 104      Carbon Dioxide (CO2) 24      Anion Gap 11      Urea Nitrogen 15.2      Creatinine 0.76      GFR Estimate >90      Calcium 9.3      Glucose 91     TROPONIN T, HIGH SENSITIVITY - Normal    Troponin T, High Sensitivity <6     CBC WITH PLATELETS AND DIFFERENTIAL    WBC Count 10.5      RBC Count 4.38      Hemoglobin 14.0      Hematocrit 40.7      MCV 93      MCH 32.0      MCHC 34.4      RDW 12.6       "Platelet Count 429      % Neutrophils 59      % Lymphocytes 29      % Monocytes 7      % Eosinophils 4      % Basophils 1      % Immature Granulocytes 0      NRBCs per 100 WBC 0      Absolute Neutrophils 6.1      Absolute Lymphocytes 3.1      Absolute Monocytes 0.7      Absolute Eosinophils 0.5      Absolute Basophils 0.1      Absolute Immature Granulocytes 0.0      Absolute NRBCs 0.0         Imaging   US Breast Left Limited 1-3 Quadrants         XR Chest 2 Views   Final Result   IMPRESSION:       Lungs are clear. No pleural effusions or pneumothorax. Normal cardiac silhouette.          EKG   Normal sinus rhythm.  Rate of 94.  Normal IA and QRS.  QTc 415.  No acute ST elevation or depression as compared with 11/14/2024 EKG.   Read by Paul Bettencourt DO at 1819.    Independent Interpretation   {IndependentReview:293491::\"None\"}    ED Course      Medications Administered   Medications   ketorolac (TORADOL) injection 15 mg (15 mg Intravenous $Given 12/12/24 1843)   acetaminophen (TYLENOL) tablet 975 mg (975 mg Oral $Given 12/12/24 1843)   oxyCODONE (ROXICODONE) tablet 5 mg (5 mg Oral $Given 12/12/24 2256)       Procedures   Procedures     Discussion of Management   {Consults/Care Discussions:378901::\"None\"}    ED Course   ED Course as of 12/12/24 2257   Thu Dec 12, 2024   1812 I obtained patient history and performed a physical exam.    1822 I updated patient and made her aware that ultrasound will only able to check for the presence of an abscess, or lack thereof, so she would likely need a repeat ultrasound which she is okay with undergoing.   1925 D-Dimer Quantitative: <0.27  Will obtain CXR   1955 XR Chest 2 Views  On my independent type Tatian of chest x-ray, there is no obvious focal opacity, mediastinal widening, or pneumothorax.   2241 I spoke with Dr. Wiseman.   2256 I updated patient who is comfortable leaving.       Additional Documentation  {EPPAAdditionalPhrase:821310::\"None\"}    Medical Decision Making / " Diagnosis     CMS Diagnoses: None    MIPS       None    MDM   Rachel Danielle is a 29-year-old female as described above presents to the emergency department for 1 month history of left-sided chest wall pain, 1 week history of left breast lump development, and 1 day history of worsening left breast pain with radiation to left axilla.  No known prior history of breast malignancy or family history of breast malignancy.  Patient endorsed that she is still having pleuritic symptoms with deep respirations.  Female ED technician chaperoned breast examination does yield a tender roughly almond sized nodularity in the 11 o'clock position of the left breast.  No evidence of overlying erythema or swelling.  No nipple discharge.  Differential diagnosis considered includes, but not limited to, left-sided leg malignancy, breast abscess, fibrocystic/inflammatory changes of the breast, pulmonary embolism, pneumonia, and less likely pericarditis/myocarditis/ACS.  Cardiac workup ordered.  D-dimer for screening for pulmonary embolism.  Cardiac enzyme for screening for pericarditis/myocarditis/ischemic heart disease.  Left breast ultrasound for evaluation for underlying abscess.  Trial of Tylenol and Toradol.  Chest imaging modality pending D-dimer.  If workup negative, anticipate discharge and outpatient follow-up with OB/GYN for further evaluation of left-sided breast mass.  Discussed care plan with patient who voiced understanding and agreement with plan.  Answered all questions.  Additional workup and orders as listed in chart.     Ultimately, work up shows ***.     Patient was ***     Please refer to ED course above as part of continuation of MDM for details on the patient's treatment course and any potential changes or updates beyond my initial evaluation and MDM creation.    Disposition   The patient was discharged.     Diagnosis     ICD-10-CM    1. Mass of upper inner quadrant of left breast  N63.22       2. Breast  pain, left  N64.4       3. Pleuritic chest pain  R07.81            Discharge Medications   New Prescriptions    OXYCODONE (ROXICODONE) 5 MG TABLET    Take 1 tablet (5 mg) by mouth every 6 hours as needed for severe pain.         Scribe Disclosure:  I, Judith Weber, am serving as a scribe at 6:15 PM on 12/12/2024 to document services personally performed by Paul Bettencourt DO based on my observations and the provider's statements to me.

## 2024-12-13 NOTE — ED NOTES
Pt continues to rate left sided chest and breast pain as 10/10. Reports no relief from tylenol and toradol.

## 2024-12-13 NOTE — DISCHARGE INSTRUCTIONS
After Your Breast Biopsy  Bleeding, bruising, and pain  Breast tenderness and some bruising is normal and may last several days. You may wear your bra overnight to support the breast.  You may use an ice pack for pain. Place it over the area for 15 to 20 minutes, several times a day.  You may take over-the-counter pain medicine:  On the day of the biopsy, we recommend Tylenol (acetaminophen) because it does not raise your risk of bleeding.  The next day, you may take an anti-inflammatory medicine (aspirin, ibuprofen, Motrin, Aleve, Advil), unless your doctor tells you not to.  Bandages and showering  Keep your bandage in place until tomorrow morning. Don't get it wet.  If you have small pieces of tape on the skin, leave them in place. They will fall off on their own, or you can remove them after 5 days.  You may shower the next morning after your biopsy.  Activity  No heavy activity (no running, no gym workouts, no lifting, no vacuuming, etc.) on the day of your biopsy.  You may go back to normal activity the next day. But limit what you do if you still have pain or discomfort.  Infection  Infection is rare. Signs of infection include:  Fever (including sweats and chills)  Redness  Pain that gets worse  Fluid draining from the biopsy site  Biopsy results  Results may take up to 5 business days.  A nurse or doctor from the Breast Center will call with your results. We will also send the results to the doctor that ordered your biopsy.  If you have not gotten your results in 5 days, please call the Breast Center.  Call the Breast Center with questions or if:   You have bleeding that lasts more than 20 minutes.  You have pain that you can't control.  You have signs of infection (fever, sweats, chills, redness, increasing pain, or drainage).  After hours, please call the doctor who ordered your biopsy.  For informational purposes only. Not to replace the advice of your health care provider. Copyright   2010 [facility]. All rights reserved. Clinically reviewed by Shraddha Dietz, Director, [facility]. [employer] [id number] - REV 08/23.

## 2024-12-17 ENCOUNTER — TELEPHONE (OUTPATIENT)
Dept: MAMMOGRAPHY | Facility: CLINIC | Age: 29
End: 2024-12-17
Payer: COMMERCIAL

## 2024-12-17 LAB
PATH REPORT.COMMENTS IMP SPEC: NORMAL
PATH REPORT.COMMENTS IMP SPEC: NORMAL
PATH REPORT.FINAL DX SPEC: NORMAL
PATH REPORT.GROSS SPEC: NORMAL
PATH REPORT.MICROSCOPIC SPEC OTHER STN: NORMAL
PHOTO IMAGE: NORMAL

## 2024-12-17 NOTE — TELEPHONE ENCOUNTER
Call placed to Rachel.   verified.     Rachel was notified that pathology results from her 2024 LEFT BREAST BIOPSY revealed:     [facility]  Rachel Reyes  [id number]  F, 1995  Surgical Pathology Report (Final result) [id number]  Authorizing Provider: Colt Scruggs MD Ordering Provider: Colt Scruggs MD  Ordering Location: [facility]  Collected: 2024 10:35 AM  Pathologist: Kayla Chambers MD Received: 2024 11:21 AM  .  Specimens  A Breast, Left, Breast Biospy Needle  .  .  Final Diagnosis  A. 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  Electronically signed by Kayla Chambers MD on 2024 at 8:32 AM     Per [facility] Radiologist, Dr. Colt Scruggs , results are concordant with imaging findings.     Recommendation: Surgical Consult if desired/Annual Screening Mammograms at age 41     William denies any concerns with the biopsy site. William has an appointment with Dr. Lisa Gil on 24 to discuss removal of this painful fibroadenoma.  Ordering provider was informed of the results and follow up plan.  I encouraged her to contact her doctor with any further breast concerns.  Patient verbalized understanding and agrees with the plan of care.        Chelsea Marcelino RN BSN  Procedure Nurse  [facility]  [phone]

## 2024-12-27 ENCOUNTER — OFFICE VISIT (OUTPATIENT)
Dept: SURGERY | Facility: CLINIC | Age: 29
End: 2024-12-27
Payer: COMMERCIAL

## 2024-12-27 VITALS
DIASTOLIC BLOOD PRESSURE: 70 MMHG | SYSTOLIC BLOOD PRESSURE: 119 MMHG | BODY MASS INDEX: 20.49 KG/M2 | RESPIRATION RATE: 16 BRPM | HEIGHT: 64 IN | OXYGEN SATURATION: 95 % | WEIGHT: 120 LBS | HEART RATE: 97 BPM

## 2024-12-27 DIAGNOSIS — D24.2 FIBROADENOMA OF LEFT BREAST: Primary | ICD-10-CM

## 2024-12-27 PROCEDURE — 99203 OFFICE O/P NEW LOW 30 MIN: CPT | Performed by: SURGERY

## 2024-12-27 RX ORDER — IBUPROFEN 800 MG/1
800 TABLET, FILM COATED ORAL EVERY 8 HOURS PRN
COMMUNITY

## 2024-12-27 NOTE — PROGRESS NOTES
[facility] Breast Surgery Consultation    HPI:   Rachel Danielle is a 29 year old female who is seen in consultation at the request of herself for evaluation of a left breast fibroadenoma.     She presented to the ER due to chest pain and was found to have a left breast mass.  She then had diagnostic breast imaging and an ultrasound on 12/12/2024 revealed a hypoechoic mass at 11:00 measuring 1.3 cm.  She then underwent ultrasound-guided core needle biopsy which revealed a fibroadenoma.    She reports ongoing significant left breast pain that radiates to her left axilla.  She has a history of bilateral breast pain that is often related but not always to her menstrual cycle.  She denies any issues with nipple discharge or drainage.  She has not had any prior breast biopsies or breast surgeries.  She has had a prior laparoscopic appendectomy and did well after surgery without any issues related to anesthesia.  I actually performed the surgery for her.    Hormonal history:   menarche [age], no children, pre-menopausal, no current OCP use, no HRT, no fertility treatment.       Past Medical History:   has a past medical history of ADHD, Anxiety, and Missed ab.      Current Outpatient Medications:     hydrOXYzine (ATARAX) 25 MG tablet, Take 10 mg by mouth 3 times daily as needed for itching, Disp: , Rfl:     lamoTRIgine (LAMICTAL) 25 MG tablet, Take 25 mg by mouth daily, Disp: , Rfl:     oxyCODONE (ROXICODONE) 5 MG tablet, Take 1 tablet (5 mg) by mouth every 6 hours as needed for severe pain., Disp: 18 tablet, Rfl: 0    oxyCODONE (ROXICODONE) 5 MG tablet, Take 1-2 tablets (5-10 mg) by mouth every 3 hours as needed for pain (Moderate to Severe) (Patient not taking: Reported on 6/22/2021), Disp: 12 tablet, Rfl: 0    predniSONE (DELTASONE) 20 MG tablet, Take two tablets (= 40mg) each day for 5 (five) days, Disp: 10 tablet, Rfl: 0    SENNA-docusate sodium (SENNA S) 8.6-50 MG tablet, Take 1 tablet by  "mouth 2 times daily (Patient not taking: Reported on 6/22/2021), Disp: 60 tablet, Rfl: 0    Past Surgical History:  Past Surgical History:   Procedure Laterality Date    DILATION AND CURETTAGE SUCTION WITH ULTRASOUND GUIDANCE N/A 3/12/2018    Procedure: DILATION AND CURETTAGE SUCTION WITH ULTRASOUND GUIDANCE;;  Surgeon: Lukas Tsai MD;  Location: [facility]    LAPAROSCOPIC APPENDECTOMY N/A 8/8/2020    Procedure: APPENDECTOMY, LAPAROSCOPIC;  Surgeon: Lisa Gil MD;  Location:  OR           Allergies   Allergen Reactions    Tree Nuts [Nuts] Swelling     Throat and tongue swell up.         Social History:  Social History     Socioeconomic History    Marital status: Single     Spouse name: Not on file    Number of children: Not on file    Years of education: Not on file    Highest education level: Not on file   Occupational History    Not on file   Tobacco Use    Smoking status: Some Days    Smokeless tobacco: Never   Substance and Sexual Activity    Alcohol use: Yes     Comment: 1 time per month    Drug use: Yes     Types: Marijuana     Comment: THC all day everyday.  Had some today.    Sexual activity: Yes     Partners: Male     Birth control/protection: Condom   Other Topics Concern    Parent/sibling w/ CABG, MI or angioplasty before 65F 55M? Not Asked   Social History Narrative    Not on file     Social Drivers of Health     Financial Resource Strain: Not on file   Food Insecurity: Not on file   Transportation Needs: Not on file   Physical Activity: Not on file   Stress: Not on file   Social Connections: Not on file   Interpersonal Safety: Not on file   Housing Stability: Not on file        ROS:  The 10 point review of systems is negative other than noted in the HPI and above.    PE:  Vitals: /70   Pulse 97   Resp 16   Ht 1.626 m (5' 4\")   Wt 54.4 kg (120 lb)   LMP 11/07/2024 (Approximate)   SpO2 95%   BMI 20.60 kg/m    General appearance: well-nourished, sitting comfortably, no apparent " distress  Psych: normal affect, pleasant  HEENT:  Head normocephalic and atraumatic, pupils equal and round, conjunctivae clear, mucous membranes moist, external ears and nose normal  Neck: Supple without thyromegaly or masses  Lungs: Respirations unlabored  Lymphatic: No cervical, or supraclavicular lymphadenopathy  Extremities: Without edema  Musculoskeletal:  Normal station and gait  Neurologic: nonfocal, grossly intact times four extremities, alert and oriented times three  Psychiatric: Mood and affect are appropriate  Skin: Without lesions or rashes    Breast:  A bilateral breast exam was performed in the supine position.. Bilateral breasts were palpated in a circumferential clockwise fashion including the supraclavicular and axillary areas.   Breasts are symmetric.  Nipples are everted and appear normal bilaterally.  Small biopsy site on the skin on the left upper breast.  There is a palpable mass in the left upper breast at 11:00, 4 to 5 cm from nipple that is 1.5 to 2 cm in size.  She is extremely tender to palpation of this mass.  There are no other masses palpable in either breast although exam is limited due to patient tenderness with exam.      Lymph:       No supraclavicular/infraclavicular adenopathy.   Axillary adenopathy: There is a palpable lymph node in the mid left axilla that is small and mobile and tender to palpation.    Assessment/Plan: Rachel Danielle is a 29 year old who presents with a very symptomatic fibroadenoma in her left upper inner breast.  We discussed fibroadenomas at length.  We discussed her options going forward and she would like to proceed with surgical excision.  We discussed the risks, benefits, indications and alternatives and she would like to proceed with scheduling.  Given the degree of pain she is having we will try to expedite scheduling to get this removed for her.      30 minutes total time spent on the date of this encounter doing: chart review, review of  test results, patient visit, physical exam, education, counseling, developing plan of care, and documenting.    Lisa Gil MD      Please route or send letter to:  Primary Care Provider (PCP) and Referring Provider

## 2024-12-31 ENCOUNTER — TELEPHONE (OUTPATIENT)
Dept: SURGERY | Facility: CLINIC | Age: 29
End: 2024-12-31
Payer: COMMERCIAL

## 2025-01-07 NOTE — DISCHARGE INSTRUCTIONS
[facility]  Discharge Instructions: Post-Operative Breast Surgery    ACTIVITY  Take frequent short walks and increase your activity gradually.    Avoid strenuous physical activity or heavy lifting greater than 15-20 lbs. for 1-2 weeks with arm on the surgery side.  You may climb stairs.  Gentle rotation and stretching of your arms and shoulders will prevent joint stiffness.  You may drive without restrictions when you are not using any prescription pain medication and feel comfortable in a car.  You may return to work/school when you are comfortable without any prescription pain medication.    WOUND CARE  You may remove your ACE wrap/dressing and shower 48 hours after the surgery.  Pat your incisions dry and leave them open to air.  Re-apply dressing (Band-Aids or gauze/tape) as needed for drainage.  You may have steri-strips (looks like white tape) on your incisions.  You may peel off the steri-strips 2 weeks after your surgery if they have not peeled off on their own.  Do not soak your incisions in a tub or pool for 2 weeks.   Do not apply any lotions, creams, or ointments to your incisions.  A ridge under your incisions is normal and will gradually resolve.  Wear a supportive bra for 1-2 weeks, day and night.    DIET  Start with liquids, then gradually resume your regular diet as tolerated.   Drink plenty of liquids to stay hydrated.    PAIN  Expect some tenderness and discomfort at the incision site(s).  Use the prescribed pain medication at your discretion.  Expect gradual resolution of your pain over several days.  You may take ibuprofen with food (unless you have been told not to) or acetaminophen/Tylenol instead of or in addition to your prescribed pain medication.  However, if you are taking Norco do not take any additional acetaminophen/Tylenol.  Do not drink alcohol or drive while you are taking pain medications.    EXPECTATIONS  Pain medications can cause constipation.   Limit use when possible.  Take an over the counter or prescribed stool softener/stimulant, such as Colace or Senna, 1-2 times a day with plenty of water.  You may take a mild over the counter laxative, such as Miralax or a suppository, as needed.    You may discontinue these medications once you are having regular bowel movements and/or are no longer taking your narcotic pain medication.    RETURN APPOINTMENT  Follow up with your surgeon in 2-4 weeks.  Please call the office at [phone] to schedule your appointment.      CALL OUR OFFICE -[phone] IF YOU HAVE:   Chills or fever above 101 F.  Increased redness, warmth, or drainage at your incisions.  Significant bleeding.  Pain not relieved by your pain medication or rest.  Increasing pain after the first 48 hours.  Any other concerns or questions.             Same Day Surgery Discharge Instructions for  Sedation and General Anesthesia     It's not unusual to feel dizzy, light-headed or faint for up to 24 hours after surgery or while taking pain medication.  If you have these symptoms: sit for a few minutes before standing and have someone assist you when you get up to walk or use the bathroom.    You should rest and relax for the next 24 hours. We recommend you make arrangements to have an adult stay with you for at least 24 hours after your discharge.  Avoid hazardous and strenuous activity.    DO NOT DRIVE any vehicle or operate mechanical equipment for 24 hours following the end of your surgery.  Even though you may feel normal, your reactions may be affected by the medication you have received.    Do not drink alcoholic beverages for 24 hours following surgery.     Slowly progress to your regular diet as you feel able. It's not unusual to feel nauseated and/or vomit after receiving anesthesia.  If you develop these symptoms, drink clear liquids (apple juice, ginger ale, broth, 7-up, etc. ) until you feel better.  If your nausea and vomiting persists for  24 hours, please notify your surgeon.      All narcotic pain medications, along with inactivity and anesthesia, can cause constipation. Drinking plenty of liquids and increasing fiber intake will help.    For any questions of a medical nature, call your surgeon.    Do not make important decisions for 24 hours.    If you had general anesthesia, you may have a sore throat for a couple of days related to the breathing tube used during surgery.  You may use Cepacol lozenges to help with this discomfort.  If it worsens or if you develop a fever, contact your surgeon.     If you feel your pain is not well managed with the pain medications prescribed by your surgeon, please contact your surgeon's office to let them know so they can address your concerns.      **If you have concerns or questions about your procedure,    please contact Dr Gil at  [phone]**

## 2025-01-08 ENCOUNTER — ANESTHESIA (OUTPATIENT)
Dept: SURGERY | Facility: CLINIC | Age: 30
End: 2025-01-08
Payer: COMMERCIAL

## 2025-01-08 ENCOUNTER — HOSPITAL ENCOUNTER (OUTPATIENT)
Facility: CLINIC | Age: 30
Discharge: HOME | End: 2025-01-08
Attending: SURGERY | Admitting: SURGERY
Payer: COMMERCIAL

## 2025-01-08 ENCOUNTER — ANESTHESIA EVENT (OUTPATIENT)
Dept: SURGERY | Facility: CLINIC | Age: 30
End: 2025-01-08
Payer: COMMERCIAL

## 2025-01-08 VITALS
SYSTOLIC BLOOD PRESSURE: 113 MMHG | TEMPERATURE: 97 F | OXYGEN SATURATION: 99 % | RESPIRATION RATE: 18 BRPM | BODY MASS INDEX: 21.51 KG/M2 | WEIGHT: 126 LBS | HEIGHT: 64 IN | DIASTOLIC BLOOD PRESSURE: 72 MMHG | HEART RATE: 71 BPM

## 2025-01-08 DIAGNOSIS — D24.2 FIBROADENOMA OF LEFT BREAST: ICD-10-CM

## 2025-01-08 LAB — HCG UR QL: NEGATIVE

## 2025-01-08 PROCEDURE — 250N000009 HC RX 250: Performed by: REGISTERED NURSE

## 2025-01-08 PROCEDURE — 250N000009 HC RX 250: Performed by: SURGERY

## 2025-01-08 PROCEDURE — 19125 EXCISION BREAST LESION: CPT | Mod: LT | Performed by: SURGERY

## 2025-01-08 PROCEDURE — 272N000001 HC OR GENERAL SUPPLY STERILE: Performed by: SURGERY

## 2025-01-08 PROCEDURE — 88307 TISSUE EXAM BY PATHOLOGIST: CPT | Mod: 26 | Performed by: STUDENT IN AN ORGANIZED HEALTH CARE EDUCATION/TRAINING PROGRAM

## 2025-01-08 PROCEDURE — 710N000009 HC RECOVERY PHASE 1, LEVEL 1, PER MIN: Performed by: SURGERY

## 2025-01-08 PROCEDURE — 250N000011 HC RX IP 250 OP 636: Performed by: SURGERY

## 2025-01-08 PROCEDURE — 258N000003 HC RX IP 258 OP 636: Performed by: ANESTHESIOLOGY

## 2025-01-08 PROCEDURE — 88307 TISSUE EXAM BY PATHOLOGIST: CPT | Mod: TC | Performed by: SURGERY

## 2025-01-08 PROCEDURE — 360N000074 HC SURGERY LEVEL 1, PER MIN: Performed by: SURGERY

## 2025-01-08 PROCEDURE — 710N000012 HC RECOVERY PHASE 2, PER MINUTE: Performed by: SURGERY

## 2025-01-08 PROCEDURE — 999N000141 HC STATISTIC PRE-PROCEDURE NURSING ASSESSMENT: Performed by: SURGERY

## 2025-01-08 PROCEDURE — 370N000017 HC ANESTHESIA TECHNICAL FEE, PER MIN: Performed by: SURGERY

## 2025-01-08 PROCEDURE — 81025 URINE PREGNANCY TEST: CPT | Performed by: ANESTHESIOLOGY

## 2025-01-08 PROCEDURE — 250N000011 HC RX IP 250 OP 636: Performed by: REGISTERED NURSE

## 2025-01-08 RX ORDER — PROPOFOL 10 MG/ML
INJECTION, EMULSION INTRAVENOUS CONTINUOUS PRN
Status: DISCONTINUED | OUTPATIENT
Start: 2025-01-08 | End: 2025-01-08

## 2025-01-08 RX ORDER — FENTANYL CITRATE 0.05 MG/ML
50 INJECTION, SOLUTION INTRAMUSCULAR; INTRAVENOUS EVERY 5 MIN PRN
Status: DISCONTINUED | OUTPATIENT
Start: 2025-01-08 | End: 2025-01-08 | Stop reason: HOSPADM

## 2025-01-08 RX ORDER — LIDOCAINE HYDROCHLORIDE 20 MG/ML
INJECTION, SOLUTION INFILTRATION; PERINEURAL PRN
Status: DISCONTINUED | OUTPATIENT
Start: 2025-01-08 | End: 2025-01-08

## 2025-01-08 RX ORDER — PROPOFOL 10 MG/ML
INJECTION, EMULSION INTRAVENOUS PRN
Status: DISCONTINUED | OUTPATIENT
Start: 2025-01-08 | End: 2025-01-08

## 2025-01-08 RX ORDER — HYDROMORPHONE HCL IN WATER/PF 6 MG/30 ML
0.2 PATIENT CONTROLLED ANALGESIA SYRINGE INTRAVENOUS EVERY 5 MIN PRN
Status: DISCONTINUED | OUTPATIENT
Start: 2025-01-08 | End: 2025-01-08 | Stop reason: HOSPADM

## 2025-01-08 RX ORDER — LIDOCAINE 40 MG/G
CREAM TOPICAL
Status: DISCONTINUED | OUTPATIENT
Start: 2025-01-08 | End: 2025-01-08 | Stop reason: HOSPADM

## 2025-01-08 RX ORDER — ONDANSETRON 2 MG/ML
4 INJECTION INTRAMUSCULAR; INTRAVENOUS EVERY 30 MIN PRN
Status: DISCONTINUED | OUTPATIENT
Start: 2025-01-08 | End: 2025-01-08 | Stop reason: HOSPADM

## 2025-01-08 RX ORDER — SODIUM CHLORIDE, SODIUM LACTATE, POTASSIUM CHLORIDE, CALCIUM CHLORIDE 600; 310; 30; 20 MG/100ML; MG/100ML; MG/100ML; MG/100ML
INJECTION, SOLUTION INTRAVENOUS CONTINUOUS
Status: DISCONTINUED | OUTPATIENT
Start: 2025-01-08 | End: 2025-01-08 | Stop reason: HOSPADM

## 2025-01-08 RX ORDER — FENTANYL CITRATE 50 UG/ML
INJECTION, SOLUTION INTRAMUSCULAR; INTRAVENOUS PRN
Status: DISCONTINUED | OUTPATIENT
Start: 2025-01-08 | End: 2025-01-08

## 2025-01-08 RX ORDER — ONDANSETRON 4 MG/1
4 TABLET, ORALLY DISINTEGRATING ORAL EVERY 30 MIN PRN
Status: DISCONTINUED | OUTPATIENT
Start: 2025-01-08 | End: 2025-01-08 | Stop reason: HOSPADM

## 2025-01-08 RX ORDER — HYDROCODONE BITARTRATE AND ACETAMINOPHEN 5; 325 MG/1; MG/1
1 TABLET ORAL
Status: DISCONTINUED | OUTPATIENT
Start: 2025-01-08 | End: 2025-01-08 | Stop reason: HOSPADM

## 2025-01-08 RX ORDER — HYDROMORPHONE HCL IN WATER/PF 6 MG/30 ML
0.4 PATIENT CONTROLLED ANALGESIA SYRINGE INTRAVENOUS EVERY 5 MIN PRN
Status: DISCONTINUED | OUTPATIENT
Start: 2025-01-08 | End: 2025-01-08 | Stop reason: HOSPADM

## 2025-01-08 RX ORDER — HYDROCODONE BITARTRATE AND ACETAMINOPHEN 5; 325 MG/1; MG/1
1 TABLET ORAL EVERY 4 HOURS PRN
Qty: 10 TABLET | Refills: 0 | Status: SHIPPED | OUTPATIENT
Start: 2025-01-08

## 2025-01-08 RX ORDER — ONDANSETRON 2 MG/ML
INJECTION INTRAMUSCULAR; INTRAVENOUS PRN
Status: DISCONTINUED | OUTPATIENT
Start: 2025-01-08 | End: 2025-01-08

## 2025-01-08 RX ORDER — NALOXONE HYDROCHLORIDE 0.4 MG/ML
0.1 INJECTION, SOLUTION INTRAMUSCULAR; INTRAVENOUS; SUBCUTANEOUS
Status: DISCONTINUED | OUTPATIENT
Start: 2025-01-08 | End: 2025-01-08 | Stop reason: HOSPADM

## 2025-01-08 RX ORDER — DEXMEDETOMIDINE HYDROCHLORIDE 4 UG/ML
INJECTION, SOLUTION INTRAVENOUS PRN
Status: DISCONTINUED | OUTPATIENT
Start: 2025-01-08 | End: 2025-01-08

## 2025-01-08 RX ORDER — CEFAZOLIN SODIUM/WATER 2 G/20 ML
2 SYRINGE (ML) INTRAVENOUS
Status: COMPLETED | OUTPATIENT
Start: 2025-01-08 | End: 2025-01-08

## 2025-01-08 RX ORDER — DEXAMETHASONE SODIUM PHOSPHATE 4 MG/ML
INJECTION, SOLUTION INTRA-ARTICULAR; INTRALESIONAL; INTRAMUSCULAR; INTRAVENOUS; SOFT TISSUE PRN
Status: DISCONTINUED | OUTPATIENT
Start: 2025-01-08 | End: 2025-01-08

## 2025-01-08 RX ORDER — DEXAMETHASONE SODIUM PHOSPHATE 4 MG/ML
4 INJECTION, SOLUTION INTRA-ARTICULAR; INTRALESIONAL; INTRAMUSCULAR; INTRAVENOUS; SOFT TISSUE
Status: DISCONTINUED | OUTPATIENT
Start: 2025-01-08 | End: 2025-01-08 | Stop reason: HOSPADM

## 2025-01-08 RX ORDER — MAGNESIUM HYDROXIDE 1200 MG/15ML
LIQUID ORAL PRN
Status: DISCONTINUED | OUTPATIENT
Start: 2025-01-08 | End: 2025-01-08 | Stop reason: HOSPADM

## 2025-01-08 RX ORDER — AMOXICILLIN 250 MG
1-2 CAPSULE ORAL 2 TIMES DAILY PRN
Qty: 20 TABLET | Refills: 0 | Status: SHIPPED | OUTPATIENT
Start: 2025-01-08

## 2025-01-08 RX ORDER — CEFAZOLIN SODIUM/WATER 2 G/20 ML
2 SYRINGE (ML) INTRAVENOUS SEE ADMIN INSTRUCTIONS
Status: DISCONTINUED | OUTPATIENT
Start: 2025-01-08 | End: 2025-01-08 | Stop reason: HOSPADM

## 2025-01-08 RX ORDER — FENTANYL CITRATE 0.05 MG/ML
25 INJECTION, SOLUTION INTRAMUSCULAR; INTRAVENOUS EVERY 5 MIN PRN
Status: DISCONTINUED | OUTPATIENT
Start: 2025-01-08 | End: 2025-01-08 | Stop reason: HOSPADM

## 2025-01-08 RX ADMIN — DEXMEDETOMIDINE HYDROCHLORIDE 12 MCG: 200 INJECTION INTRAVENOUS at 13:29

## 2025-01-08 RX ADMIN — FENTANYL CITRATE 50 MCG: 50 INJECTION INTRAMUSCULAR; INTRAVENOUS at 13:45

## 2025-01-08 RX ADMIN — DEXAMETHASONE SODIUM PHOSPHATE 4 MG: 4 INJECTION, SOLUTION INTRA-ARTICULAR; INTRALESIONAL; INTRAMUSCULAR; INTRAVENOUS; SOFT TISSUE at 13:45

## 2025-01-08 RX ADMIN — PROPOFOL 50 MG: 10 INJECTION, EMULSION INTRAVENOUS at 13:27

## 2025-01-08 RX ADMIN — SODIUM CHLORIDE, POTASSIUM CHLORIDE, SODIUM LACTATE AND CALCIUM CHLORIDE: 600; 310; 30; 20 INJECTION, SOLUTION INTRAVENOUS at 13:24

## 2025-01-08 RX ADMIN — ONDANSETRON 4 MG: 2 INJECTION INTRAMUSCULAR; INTRAVENOUS at 13:45

## 2025-01-08 RX ADMIN — LIDOCAINE HYDROCHLORIDE 60 MG: 20 INJECTION, SOLUTION INFILTRATION; PERINEURAL at 13:27

## 2025-01-08 RX ADMIN — PROPOFOL 150 MCG/KG/MIN: 10 INJECTION, EMULSION INTRAVENOUS at 13:27

## 2025-01-08 RX ADMIN — Medication 2 G: at 13:27

## 2025-01-08 RX ADMIN — FENTANYL CITRATE 50 MCG: 50 INJECTION INTRAMUSCULAR; INTRAVENOUS at 13:28

## 2025-01-08 RX ADMIN — MIDAZOLAM 2 MG: 1 INJECTION INTRAMUSCULAR; INTRAVENOUS at 13:26

## 2025-01-08 ASSESSMENT — ACTIVITIES OF DAILY LIVING (ADL)
ADLS_ACUITY_SCORE: 48

## 2025-01-08 NOTE — OR NURSING
Meets criteria for discharge.  Discharge instructions reviewed with pt and pt's designated responsible party.  Pt label on prescription bag from pharmacy matched to pt's wristband. Pharmacy bag opened with 2 prescriptions inside. Medications were reviewed to match pt wristband while pt and significant other agreed with identification. Prescriptions placed back in pharmacy bag resealed with tape and placed in belonging bag per pt request.

## 2025-01-08 NOTE — ANESTHESIA POSTPROCEDURE EVALUATION
Patient: Rachel Danielle    Procedure: Procedure(s):  excision left breast mass       Anesthesia Type:  MAC    Note:  Disposition: Outpatient   Postop Pain Control: Uneventful            Sign Out: Well controlled pain   PONV: No   Neuro/Psych: Uneventful            Sign Out: Acceptable/Baseline neuro status   Airway/Respiratory: Uneventful            Sign Out: Acceptable/Baseline resp. status   CV/Hemodynamics: Uneventful            Sign Out: Acceptable CV status; No obvious hypovolemia; No obvious fluid overload   Other NRE:    DID A NON-ROUTINE EVENT OCCUR?        Last vitals:  Vitals Value Taken Time   /67 01/08/25 1445   Temp 36.1  C (97  F) 01/08/25 1445   Pulse 59 01/08/25 1446   Resp 7 01/08/25 1448   SpO2 98 % 01/08/25 1448   Vitals shown include unfiled device data.    Electronically Signed By: Estefanía Nash MD  January 8, 2025  4:05 PM

## 2025-01-08 NOTE — OP NOTE
[facility] Breast Surgery Operative Note      Pre-operative diagnosis: Left breast fibroadenoma   Post-operative diagnosis: Same, pathology pending     Procedure: 1.  Left breast excisional biopsy     Surgeon: Lisa Gil MD   Assistant(s):  Diane Kam PA-C  The PA s assistance was medically necessary to provide adequate exposure in the operating field, maintain hemostasis, cutting suture, clamping and ligating bleeding vessels, and visualization of anatomic structures throughout the surgical procedure.      Anesthesia: Local with MAC    Estimated blood loss:   2 cc     Specimens: ID Type Source Tests Collected by Time Destination   1 : left breast mass Tissue Breast, Left SURGICAL PATHOLOGY EXAM Lisa Gil MD 1/8/2025  1:52 PM         INDICATION:  Please see my clinic notes for details.   DESCRIPTION OF PROCEDURE: The patient was placed on the table in supine position. Conscious sedation was induced. Perioperative antibiotics were given.  The left breast and axilla were prepped and draped in standard sterile fashion.  We made a radially oriented incision centered at the 11 o'clock position directly over the palpable mass. We carried the incision down using electrocautery into the breast tissue and excised the mass. The mass was removed in its entirety.  The specimen was then sent to pathology for review.  The biopsy marker extruded from the mass with manipulation of the tissue. It was placed with the mass to be sent to pathology. The wound was then examined for bleeding and hemostasis was achieved using electrocautery.    The biopsy cavity was reapproximated with several interrupted 3-0 vicryl sutures. The skin was closed with a deep dermal 3-0 vicryl and running 4-0 Monocryl subcuticular suture and steri strips.  The patient tolerated the procedure well.  Sponge and instrument counts were correct.    Lisa Gil MD  [facility]  [phone]

## 2025-01-08 NOTE — ANESTHESIA CARE TRANSFER NOTE
Patient: Rachel Danielle    Procedure: Procedure(s):  excision left breast mass       Diagnosis: Fibroadenoma of left breast [D24.2]  Diagnosis Additional Information: No value filed.    Anesthesia Type:   MAC     Note:    Oropharynx: oropharynx clear of all foreign objects and spontaneously breathing  Level of Consciousness: awake  Oxygen Supplementation: face mask  Level of Supplemental Oxygen (L/min / FiO2): 6  Independent Airway: airway patency satisfactory and stable  Dentition: dentition unchanged  Vital Signs Stable: post-procedure vital signs reviewed and stable    Patient transferred to: PACU    Handoff Report: Identifed the Patient, Identified the Reponsible Provider, Reviewed the pertinent medical history, Discussed the surgical course, Reviewed Intra-OP anesthesia mangement and issues during anesthesia, Set expectations for post-procedure period and Allowed opportunity for questions and acknowledgement of understanding    Vitals:  Vitals Value Taken Time   BP     Temp     Pulse 66 01/08/25 1412   Resp 23 01/08/25 1412   SpO2 100 % 01/08/25 1412   Vitals shown include unfiled device data.    Electronically Signed By: ERIKA Wesley CRNA  January 8, 2025  2:13 PM

## 2025-01-08 NOTE — ANESTHESIA PREPROCEDURE EVALUATION
Anesthesia Pre-Procedure Evaluation    Patient: Rachel Danielle   MRN: [id number] : 1995        Procedure : Procedure(s):  excision left breast mass          Past Medical History:   Diagnosis Date    ADHD     Anxiety     GERD (gastroesophageal reflux disease)     Missed ab     Motion sickness     PONV (postoperative nausea and vomiting)       Past Surgical History:   Procedure Laterality Date    DILATION AND CURETTAGE SUCTION WITH ULTRASOUND GUIDANCE N/A 3/12/2018    Procedure: DILATION AND CURETTAGE SUCTION WITH ULTRASOUND GUIDANCE;;  Surgeon: Lukas Tsai MD;  Location:  [facility]    LAPAROSCOPIC APPENDECTOMY N/A 2020    Procedure: APPENDECTOMY, LAPAROSCOPIC;  Surgeon: Lisa Gil MD;  Location:  [facility]      Allergies   Allergen Reactions    Tree Nuts [Nuts] Swelling     Throat and tongue swell up.      Social History     Tobacco Use    Smoking status: Never    Smokeless tobacco: Never   Substance Use Topics    Alcohol use: Yes     Comment: 1 time per month      Wt Readings from Last 1 Encounters:   25 57.2 kg (126 lb)        Anesthesia Evaluation   Pt has had prior anesthetic.     History of anesthetic complications  - motion sickness and PONV.      ROS/MED HX  ENT/Pulmonary:       Neurologic:       Cardiovascular:       METS/Exercise Tolerance: >4 METS    Hematologic:       Musculoskeletal:       GI/Hepatic:     (+) GERD,                   Renal/Genitourinary:       Endo:       Psychiatric/Substance Use:     (+) psychiatric history anxiety and other (comment) (ADHD)       Infectious Disease:       Malignancy:       Other:            Physical Exam    Airway        Mallampati: II   TM distance: > 3 FB   Neck ROM: full   Mouth opening: > 3 cm    Respiratory Devices and Support         Dental  no notable dental history     (+) Completely normal teeth      Cardiovascular          Rhythm and rate: regular and normal     Pulmonary   pulmonary exam normal                OUTSIDE LABS:  CBC:  "  Lab Results   Component Value Date    WBC 10.5 12/12/2024    WBC 12.5 (H) 11/14/2024    HGB 14.0 12/12/2024    HGB 13.3 11/14/2024    HCT 40.7 12/12/2024    HCT 39.4 11/14/2024     12/12/2024     11/14/2024     BMP:   Lab Results   Component Value Date     12/12/2024     11/14/2024    POTASSIUM 3.5 12/12/2024    POTASSIUM 3.7 11/14/2024    CHLORIDE 104 12/12/2024    CHLORIDE 104 11/14/2024    CO2 24 12/12/2024    CO2 23 11/14/2024    BUN 15.2 12/12/2024    BUN 10.2 11/14/2024    CR 0.76 12/12/2024    CR 0.85 11/14/2024    GLC 91 12/12/2024    GLC 99 11/14/2024     COAGS: No results found for: \"PTT\", \"INR\", \"FIBR\"  POC:   Lab Results   Component Value Date    HCG Negative 01/08/2025    HCGS Negative 12/12/2024     HEPATIC:   Lab Results   Component Value Date    ALBUMIN 4.1 08/07/2020    PROTTOTAL 7.6 08/07/2020    ALT 13 08/07/2020    AST 22 08/07/2020    ALKPHOS 65 08/07/2020    BILITOTAL 0.5 08/07/2020     OTHER:   Lab Results   Component Value Date    JENNIFER 9.3 12/12/2024    PHOS 2.7 08/09/2020    MAG 2.0 08/09/2020    LIPASE 65 (L) 08/07/2020    TSH 0.74 06/13/2018    SED 9 11/14/2024       Anesthesia Plan    ASA Status:  2    NPO Status:  NPO Appropriate    Anesthesia Type: MAC.     - Reason for MAC: straight local not clinically adequate   Induction: N/a.   Maintenance: TIVA.        Consents    Anesthesia Plan(s) and associated risks, benefits, and realistic alternatives discussed. Questions answered and patient/representative(s) expressed understanding.     - Discussed:     - Discussed with:  Patient            Postoperative Care    Pain management: IV analgesics.   PONV prophylaxis: Ondansetron (or other 5HT-3), Dexamethasone or Solumedrol     Comments:               Rodrigue Poon MD    I have reviewed the pertinent notes and labs in the chart from the past 30 days and (re)examined the patient.  Any updates or changes from those notes are reflected in this note.                  "

## 2025-01-13 LAB
PATH REPORT.COMMENTS IMP SPEC: NORMAL
PATH REPORT.FINAL DX SPEC: NORMAL
PATH REPORT.GROSS SPEC: NORMAL
PATH REPORT.MICROSCOPIC SPEC OTHER STN: NORMAL
PATH REPORT.RELEVANT HX SPEC: NORMAL
PHOTO IMAGE: NORMAL

## 2025-01-14 ENCOUNTER — TELEPHONE (OUTPATIENT)
Dept: SURGERY | Facility: CLINIC | Age: 30
End: 2025-01-14
Payer: COMMERCIAL

## 2025-01-14 NOTE — CONFIDENTIAL NOTE
I called and left a voicemail for Rachel that we have her pathology back and is all good news.  I would like to see her for postop visit and she can call our office to schedule.    Lisa Gil MD  [facility]  [phone]

## (undated) DEVICE — BLADE CLIPPER 4406

## (undated) DEVICE — GLOVE PROTEXIS MICRO 6.0  2D73PM60

## (undated) DEVICE — PAD CHUX UNDERPAD 23X24" 7136

## (undated) DEVICE — ENDO TROCAR SLEEVE KII Z-THREADED 05X100MM CTS02

## (undated) DEVICE — GLOVE BIOGEL PI MICRO INDICATOR UNDERGLOVE SZ 6.5 48965

## (undated) DEVICE — SU MONOCRYL 4-0 PS-2 18" UND Y496G

## (undated) DEVICE — NDL 19GA 1.5"

## (undated) DEVICE — SET BREAST BIOPSY LOCALIZER 20 PROBE 8MM PENCIL 09-0006

## (undated) DEVICE — GLOVE PROTEXIS BLUE W/NEU-THERA 6.5  2D73EB65

## (undated) DEVICE — DEVICE SUTURE GRASPER TROCAR CLOSURE 14GA PMITCSG

## (undated) DEVICE — SU VICRYL 3-0 SH 27" J316H

## (undated) DEVICE — MARKER MARGIN MARKER STD 6 COLOR SGL USE MMS6

## (undated) DEVICE — DRSG STERI STRIP 1/2X4" R1547

## (undated) DEVICE — STPL ENDO RELOAD ECHELON 45X2.0MM GREY ECR45M

## (undated) DEVICE — SYSTEM CLEARIFY VISUALIZATION 21-345

## (undated) DEVICE — PACK LAP CHOLE SLC15LCFSD

## (undated) DEVICE — SOL WATER IRRIG 1000ML BOTTLE 2F7114

## (undated) DEVICE — ESU GROUND PAD UNIVERSAL W/O CORD

## (undated) DEVICE — CLIP ETHICON LIGACLIP SM BLUE LT100

## (undated) DEVICE — ENDO TROCAR FIRST ENTRY KII FIOS Z-THRD 12X100MM CTF73

## (undated) DEVICE — DRAPE BREAST/CHEST 29420

## (undated) DEVICE — GOWN IMPERVIOUS BREATHABLE SMART LG 89015

## (undated) DEVICE — ENDO SCOPE WARMER LF TM500

## (undated) DEVICE — PACK MINOR SBA15MIFSE

## (undated) DEVICE — LINEN TOWEL PACK X5 5464

## (undated) DEVICE — SU VICRYL 2-0 SH 27" J317H

## (undated) DEVICE — STPL RELOAD REG TISSUE ECHELON 45 X 3.6MM BLUE GST45B

## (undated) DEVICE — SU SILK 2-0 FSL 18" 677G

## (undated) DEVICE — ENDO POUCH UNIV RETRIEVAL SYSTEM INZII 10MM CD001

## (undated) DEVICE — SUCTION IRR STRYKERFLOW II W/TIP 250-070-520

## (undated) DEVICE — ENDO TROCAR FIRST ENTRY KII FIOS Z-THRD 05X100MM CTF03

## (undated) DEVICE — PREP CHLORAPREP 26ML TINTED HI-LITE ORANGE 930815

## (undated) DEVICE — BAG DECANTER STERILE WHITE DYNJDEC09

## (undated) DEVICE — GLOVE BIOGEL PI MICRO SZ 6.0 48560

## (undated) DEVICE — Device

## (undated) DEVICE — SUCTION CANISTER MEDIVAC LINER 3000ML W/LID 65651-530

## (undated) DEVICE — SUCTION MANIFOLD NEPTUNE 2 SYS 4 PORT 0702-020-000

## (undated) DEVICE — SYR BULB IRRIG DOVER 60 ML LATEX FREE 67000

## (undated) DEVICE — ESU HOLDER LAP INST DISP PURPLE LONG 330MM H-PRO-330

## (undated) DEVICE — VIAL DECANTER STERILE WHITE DYNJDEC06

## (undated) DEVICE — PAD CHUX UNDERPAD 30X30"

## (undated) DEVICE — PREP CHLORAPREP 26ML TINTED ORANGE  260815

## (undated) DEVICE — SU VICRYL 0 UR-6 27" J603H

## (undated) DEVICE — ESU ELEC BLADE 2.75" COATED/INSULATED E1455

## (undated) DEVICE — SOL NACL 0.9% INJ 1000ML BAG 2B1324X

## (undated) DEVICE — CLIP APPLIER ENDO ROTATING 10MM MED/LG ER320

## (undated) DEVICE — ESU PENCIL W/SMOKE EVAC NEPTUNE STRYKER 0703-046-000

## (undated) RX ORDER — FENTANYL CITRATE 50 UG/ML
INJECTION, SOLUTION INTRAMUSCULAR; INTRAVENOUS
Status: DISPENSED
Start: 2018-03-12

## (undated) RX ORDER — DIPHENHYDRAMINE HYDROCHLORIDE 50 MG/ML
INJECTION INTRAMUSCULAR; INTRAVENOUS
Status: DISPENSED
Start: 2018-03-12

## (undated) RX ORDER — BUPIVACAINE HYDROCHLORIDE 2.5 MG/ML
INJECTION, SOLUTION EPIDURAL; INFILTRATION; INTRACAUDAL
Status: DISPENSED
Start: 2025-01-08

## (undated) RX ORDER — HYDROMORPHONE HYDROCHLORIDE 1 MG/ML
INJECTION, SOLUTION INTRAMUSCULAR; INTRAVENOUS; SUBCUTANEOUS
Status: DISPENSED
Start: 2020-08-08

## (undated) RX ORDER — GLYCOPYRROLATE 0.2 MG/ML
INJECTION, SOLUTION INTRAMUSCULAR; INTRAVENOUS
Status: DISPENSED
Start: 2020-08-08

## (undated) RX ORDER — SCOLOPAMINE TRANSDERMAL SYSTEM 1 MG/1
PATCH, EXTENDED RELEASE TRANSDERMAL
Status: DISPENSED
Start: 2018-03-12

## (undated) RX ORDER — PROPOFOL 10 MG/ML
INJECTION, EMULSION INTRAVENOUS
Status: DISPENSED
Start: 2018-03-12

## (undated) RX ORDER — LIDOCAINE HYDROCHLORIDE 10 MG/ML
INJECTION, SOLUTION INFILTRATION; PERINEURAL
Status: DISPENSED
Start: 2025-01-08

## (undated) RX ORDER — FENTANYL CITRATE 50 UG/ML
INJECTION, SOLUTION INTRAMUSCULAR; INTRAVENOUS
Status: DISPENSED
Start: 2020-08-08

## (undated) RX ORDER — LIDOCAINE HYDROCHLORIDE 10 MG/ML
INJECTION, SOLUTION EPIDURAL; INFILTRATION; INTRACAUDAL; PERINEURAL
Status: DISPENSED
Start: 2020-08-08

## (undated) RX ORDER — KETOROLAC TROMETHAMINE 30 MG/ML
INJECTION, SOLUTION INTRAMUSCULAR; INTRAVENOUS
Status: DISPENSED
Start: 2020-08-08

## (undated) RX ORDER — FENTANYL CITRATE 50 UG/ML
INJECTION, SOLUTION INTRAMUSCULAR; INTRAVENOUS
Status: DISPENSED
Start: 2025-01-08

## (undated) RX ORDER — NEOSTIGMINE METHYLSULFATE 1 MG/ML
VIAL (ML) INJECTION
Status: DISPENSED
Start: 2020-08-08

## (undated) RX ORDER — LIDOCAINE HYDROCHLORIDE 20 MG/ML
INJECTION, SOLUTION EPIDURAL; INFILTRATION; INTRACAUDAL; PERINEURAL
Status: DISPENSED
Start: 2018-03-12

## (undated) RX ORDER — BUPIVACAINE HYDROCHLORIDE AND EPINEPHRINE 2.5; 5 MG/ML; UG/ML
INJECTION, SOLUTION EPIDURAL; INFILTRATION; INTRACAUDAL; PERINEURAL
Status: DISPENSED
Start: 2020-08-08

## (undated) RX ORDER — PROPOFOL 10 MG/ML
INJECTION, EMULSION INTRAVENOUS
Status: DISPENSED
Start: 2020-08-08

## (undated) RX ORDER — MEPERIDINE HYDROCHLORIDE 25 MG/ML
INJECTION INTRAMUSCULAR; INTRAVENOUS; SUBCUTANEOUS
Status: DISPENSED
Start: 2020-08-08

## (undated) RX ORDER — LIDOCAINE HYDROCHLORIDE 20 MG/ML
INJECTION, SOLUTION EPIDURAL; INFILTRATION; INTRACAUDAL; PERINEURAL
Status: DISPENSED
Start: 2020-08-08

## (undated) RX ORDER — DEXAMETHASONE SODIUM PHOSPHATE 4 MG/ML
INJECTION, SOLUTION INTRA-ARTICULAR; INTRALESIONAL; INTRAMUSCULAR; INTRAVENOUS; SOFT TISSUE
Status: DISPENSED
Start: 2020-08-08

## (undated) RX ORDER — ONDANSETRON 2 MG/ML
INJECTION INTRAMUSCULAR; INTRAVENOUS
Status: DISPENSED
Start: 2020-08-08

## (undated) RX ORDER — OXYCODONE HYDROCHLORIDE 5 MG/1
TABLET ORAL
Status: DISPENSED
Start: 2018-03-12

## (undated) RX ORDER — DEXAMETHASONE SODIUM PHOSPHATE 4 MG/ML
INJECTION, SOLUTION INTRA-ARTICULAR; INTRALESIONAL; INTRAMUSCULAR; INTRAVENOUS; SOFT TISSUE
Status: DISPENSED
Start: 2018-03-12

## (undated) RX ORDER — KETOROLAC TROMETHAMINE 30 MG/ML
INJECTION, SOLUTION INTRAMUSCULAR; INTRAVENOUS
Status: DISPENSED
Start: 2018-03-12

## (undated) RX ORDER — ONDANSETRON 2 MG/ML
INJECTION INTRAMUSCULAR; INTRAVENOUS
Status: DISPENSED
Start: 2018-03-12